# Patient Record
Sex: FEMALE | Race: WHITE | Employment: UNEMPLOYED | URBAN - METROPOLITAN AREA
[De-identification: names, ages, dates, MRNs, and addresses within clinical notes are randomized per-mention and may not be internally consistent; named-entity substitution may affect disease eponyms.]

---

## 2017-01-18 ENCOUNTER — TELEPHONE (OUTPATIENT)
Dept: OBGYN CLINIC | Facility: CLINIC | Age: 27
End: 2017-01-18

## 2017-01-18 NOTE — TELEPHONE ENCOUNTER
Pt. States that she is 4 weeks and 5 days pregnant. She confirmed pregnancy with home pregnancy test and her PCP did a urine test to confirm her pregnancy. LMP: 12/16/2016. Pt. Would like to schedule an appt.

## 2017-01-18 NOTE — TELEPHONE ENCOUNTER
Pt confirms message below and LMP 12/16/16. Pt stated she is currently taking PNV with DHA. Pt informed that our practice has 6 doctors (2 male, 3 female) and pt will see all of them throughout PN care. Pt verbalized understanding and agreed.  Pt informed h

## 2017-01-26 ENCOUNTER — TELEPHONE (OUTPATIENT)
Dept: OBGYN CLINIC | Facility: CLINIC | Age: 27
End: 2017-01-26

## 2017-01-26 ENCOUNTER — OFFICE VISIT (OUTPATIENT)
Dept: OBGYN CLINIC | Facility: CLINIC | Age: 27
End: 2017-01-26

## 2017-01-26 VITALS — HEART RATE: 66 BPM | SYSTOLIC BLOOD PRESSURE: 108 MMHG | WEIGHT: 116 LBS | DIASTOLIC BLOOD PRESSURE: 69 MMHG

## 2017-01-26 DIAGNOSIS — O20.0 THREATENED ABORTION, ANTEPARTUM: Primary | ICD-10-CM

## 2017-01-26 PROCEDURE — 99213 OFFICE O/P EST LOW 20 MIN: CPT | Performed by: OBSTETRICS & GYNECOLOGY

## 2017-01-26 RX ORDER — CHOLECALCIFEROL (VITAMIN D3) 25 MCG
1 TABLET,CHEWABLE ORAL DAILY
COMMUNITY
End: 2019-03-18

## 2017-01-26 NOTE — TELEPHONE ENCOUNTER
Fax received from pts PCP with hcg quant results. Results given to [address] [place] [address] for review.

## 2017-01-26 NOTE — H&P
First Trimester VB    HPI:  The patient is a 32 yo  @ 5w5d by LMP of 16 who presents c/o VB on and off for the last 2 days. She states on Monday night she started to feel cramping.   Then on Tuesday she started having small amt of vb when she wou VB  Musculoskeletal:  denies back pain. Skin/Breast:  Denies any breast pain, lumps, or discharge. Neurological:  denies headaches, extremity weakness  Psychiatric: denies depression or anxiety.        01/26/17  1418   BP: 108/69   Pulse: 66       PHYSIC

## 2017-01-26 NOTE — TELEPHONE ENCOUNTER
Please follow up hcg on Saturday morning (pt to have HCG Friday after work) with doc on call as I will be out of town. Pt with first trimester bleeding. Approx 5w5d today. If irregular inc then may need tvus to r/o ectopic.

## 2017-01-26 NOTE — TELEPHONE ENCOUNTER
Pt about 5w5d based off LMP of 12/17/16 and is calling today to report that she has been experiencing some vaginal spotting since Tuesday (1/24). Pt stated she went to her PCP yesterday and had an hcg quant level drawn and it was 1501.  Pt stated the spotti

## 2017-01-27 ENCOUNTER — APPOINTMENT (OUTPATIENT)
Dept: LAB | Facility: HOSPITAL | Age: 27
End: 2017-01-27
Attending: OBSTETRICS & GYNECOLOGY
Payer: COMMERCIAL

## 2017-01-27 DIAGNOSIS — O20.0 THREATENED ABORTION, ANTEPARTUM: ICD-10-CM

## 2017-01-27 LAB
B-HCG SERPL-ACNC: 1168.9 MIU/ML
RH BLOOD TYPE: POSITIVE

## 2017-01-27 PROCEDURE — 86901 BLOOD TYPING SEROLOGIC RH(D): CPT

## 2017-01-27 PROCEDURE — 84702 CHORIONIC GONADOTROPIN TEST: CPT

## 2017-01-27 PROCEDURE — 36415 COLL VENOUS BLD VENIPUNCTURE: CPT

## 2017-01-27 PROCEDURE — 86900 BLOOD TYPING SEROLOGIC ABO: CPT

## 2017-01-28 ENCOUNTER — TELEPHONE (OUTPATIENT)
Dept: OBGYN CLINIC | Facility: CLINIC | Age: 27
End: 2017-01-28

## 2017-01-28 ENCOUNTER — HOSPITAL ENCOUNTER (OUTPATIENT)
Facility: HOSPITAL | Age: 27
Discharge: HOME | End: 2017-01-28
Attending: OBSTETRICS & GYNECOLOGY | Admitting: OBSTETRICS & GYNECOLOGY
Payer: COMMERCIAL

## 2017-01-28 ENCOUNTER — HOSPITAL ENCOUNTER (OUTPATIENT)
Facility: HOSPITAL | Age: 27
End: 2017-01-28
Attending: OBSTETRICS & GYNECOLOGY | Admitting: OBSTETRICS & GYNECOLOGY
Payer: COMMERCIAL

## 2017-01-28 ENCOUNTER — HOSPITAL ENCOUNTER (EMERGENCY)
Facility: HOSPITAL | Age: 27
Discharge: HOME | End: 2017-01-28
Attending: EMERGENCY MEDICINE
Payer: COMMERCIAL

## 2017-01-28 ENCOUNTER — APPOINTMENT (OUTPATIENT)
Dept: ULTRASOUND IMAGING | Facility: HOSPITAL | Age: 27
End: 2017-01-28
Attending: EMERGENCY MEDICINE
Payer: COMMERCIAL

## 2017-01-28 VITALS
WEIGHT: 115 LBS | HEIGHT: 61 IN | OXYGEN SATURATION: 98 % | DIASTOLIC BLOOD PRESSURE: 72 MMHG | SYSTOLIC BLOOD PRESSURE: 100 MMHG | HEART RATE: 78 BPM | RESPIRATION RATE: 18 BRPM | TEMPERATURE: 98 F | BODY MASS INDEX: 21.71 KG/M2

## 2017-01-28 DIAGNOSIS — O03.4 INEVITABLE ABORTION: Primary | ICD-10-CM

## 2017-01-28 LAB
ALBUMIN SERPL BCP-MCNC: 4 G/DL (ref 3.5–4.8)
ALBUMIN/GLOB SERPL: 1.5 {RATIO} (ref 1–2)
ALP SERPL-CCNC: 40 U/L (ref 32–100)
ALT SERPL-CCNC: 18 U/L (ref 14–54)
ANION GAP SERPL CALC-SCNC: 6 MMOL/L (ref 0–18)
AST SERPL-CCNC: 19 U/L (ref 15–41)
B-HCG SERPL-ACNC: 1037.8 MIU/ML
BASOPHILS # BLD: 0.1 K/UL (ref 0–0.2)
BASOPHILS NFR BLD: 1 %
BILIRUB SERPL-MCNC: 0.8 MG/DL (ref 0.3–1.2)
BILIRUB UR QL: NEGATIVE
BUN SERPL-MCNC: 7 MG/DL (ref 8–20)
BUN/CREAT SERPL: 12.1 (ref 10–20)
CALCIUM SERPL-MCNC: 9.3 MG/DL (ref 8.5–10.5)
CHLORIDE SERPL-SCNC: 105 MMOL/L (ref 95–110)
CLARITY UR: CLEAR
CO2 SERPL-SCNC: 28 MMOL/L (ref 22–32)
COLOR UR: YELLOW
CREAT SERPL-MCNC: 0.58 MG/DL (ref 0.5–1.5)
EOSINOPHIL # BLD: 0.1 K/UL (ref 0–0.7)
EOSINOPHIL NFR BLD: 1 %
ERYTHROCYTE [DISTWIDTH] IN BLOOD BY AUTOMATED COUNT: 13 % (ref 11–15)
GLOBULIN PLAS-MCNC: 2.7 G/DL (ref 2.5–3.7)
GLUCOSE SERPL-MCNC: 102 MG/DL (ref 70–99)
GLUCOSE UR-MCNC: NEGATIVE MG/DL
HCT VFR BLD AUTO: 38.7 % (ref 35–48)
HGB BLD-MCNC: 13.2 G/DL (ref 12–16)
KETONES UR-MCNC: NEGATIVE MG/DL
LYMPHOCYTES # BLD: 1.4 K/UL (ref 1–4)
LYMPHOCYTES NFR BLD: 28 %
MCH RBC QN AUTO: 31.9 PG (ref 27–32)
MCHC RBC AUTO-ENTMCNC: 34 G/DL (ref 32–37)
MCV RBC AUTO: 93.7 FL (ref 80–100)
MONOCYTES # BLD: 0.4 K/UL (ref 0–1)
MONOCYTES NFR BLD: 8 %
NEUTROPHILS # BLD AUTO: 3.1 K/UL (ref 1.8–7.7)
NEUTROPHILS NFR BLD: 61 %
NITRITE UR QL STRIP.AUTO: NEGATIVE
OSMOLALITY UR CALC.SUM OF ELEC: 286 MOSM/KG (ref 275–295)
PH UR: 7 [PH] (ref 5–8)
PLATELET # BLD AUTO: 302 K/UL (ref 140–400)
PMV BLD AUTO: 7.2 FL (ref 7.4–10.3)
POTASSIUM SERPL-SCNC: 4 MMOL/L (ref 3.3–5.1)
PROT SERPL-MCNC: 6.7 G/DL (ref 5.9–8.4)
PROT UR-MCNC: NEGATIVE MG/DL
RBC # BLD AUTO: 4.13 M/UL (ref 3.7–5.4)
RBC #/AREA URNS AUTO: <1 /HPF
SODIUM SERPL-SCNC: 139 MMOL/L (ref 136–144)
SP GR UR STRIP: 1.01 (ref 1–1.03)
UROBILINOGEN UR STRIP-ACNC: <2
VIT C UR-MCNC: NEGATIVE MG/DL
WBC # BLD AUTO: 5.1 K/UL (ref 4–11)
WBC #/AREA URNS AUTO: <1 /HPF

## 2017-01-28 PROCEDURE — 96372 THER/PROPH/DIAG INJ SC/IM: CPT

## 2017-01-28 PROCEDURE — 76801 OB US < 14 WKS SINGLE FETUS: CPT

## 2017-01-28 PROCEDURE — 99284 EMERGENCY DEPT VISIT MOD MDM: CPT

## 2017-01-28 PROCEDURE — 85025 COMPLETE CBC W/AUTO DIFF WBC: CPT | Performed by: EMERGENCY MEDICINE

## 2017-01-28 PROCEDURE — 36415 COLL VENOUS BLD VENIPUNCTURE: CPT

## 2017-01-28 PROCEDURE — 99201 HC OUTPT EVAL AND MGNT NEW PT LEVEL 1: CPT

## 2017-01-28 PROCEDURE — 80053 COMPREHEN METABOLIC PANEL: CPT | Performed by: OBSTETRICS & GYNECOLOGY

## 2017-01-28 PROCEDURE — 76817 TRANSVAGINAL US OBSTETRIC: CPT

## 2017-01-28 PROCEDURE — 81001 URINALYSIS AUTO W/SCOPE: CPT | Performed by: EMERGENCY MEDICINE

## 2017-01-28 PROCEDURE — 84702 CHORIONIC GONADOTROPIN TEST: CPT | Performed by: EMERGENCY MEDICINE

## 2017-01-28 NOTE — ED INITIAL ASSESSMENT (HPI)
Patient presents to ER with c/o vaginal spotting since Tuesday. Patient is 6 weeks pregnant. Patient saw her OB on Friday and was told to come to ER for ultrasound.

## 2017-01-28 NOTE — TRIAGE
[facility]      Triage Note  1330 To [facility] from ED for methotrexate for ectopic pregnancy. Blood drawn, order sent to pharmacy . Oncology lead notified.   1540 oncology at bedside to administer methotrexate IM. 1600 pt discharged with orders visit:  Tariq Nieto RN  1/28/2017 3:37 PM

## 2017-01-28 NOTE — TELEPHONE ENCOUNTER
Pt calling for results. Saw ERON for bleeding in early pregnancy. Nile Arnold with PCP on 1/24 was 1501. Quant on 1/27 was 1168. Blood type is A POS.  Tasked to CAP-MD on call for results

## 2017-01-30 ENCOUNTER — TELEPHONE (OUTPATIENT)
Dept: OBGYN CLINIC | Facility: CLINIC | Age: 27
End: 2017-01-30

## 2017-01-30 DIAGNOSIS — O00.10 TUBAL ECTOPIC PREGNANCY, UNSPECIFIED WHETHER INTRAUTERINE PREGNANCY PRESENT: Primary | ICD-10-CM

## 2017-01-30 NOTE — ED PROVIDER NOTES
Patient Seen in: [facility] Emergency Department    History   Patient presents with:  Pregnancy Issues (gynecologic)      HPI    Patient presents complaining of vaginal spotting since 4 days ago. No significant bleeding, no clots.   Intermittent c above.    PSFH elements reviewed from today and agreed except as otherwise stated in HPI.     Physical Exam       ED Triage Vitals   BP 01/28/17 0950 110/70 mmHg   Pulse 01/28/17 0950 85   Resp 01/28/17 0950 18   Temp 01/28/17 0950 97.9 °F (36.6 °C)   Temp Final result                 Please view results for these tests on the individual orders. Us Ob 1st Trimester (ECG=10860/29351)    1/28/2017  PROCEDURE: US OB 1ST TRIMESTER (CPT=76801/63858)  COMPARISON: None.   INDICATIONS: Pregnancy with vaginal spot Pulse Ox: 98%, Room air, Normal     Monitor Interpretation:   normal sinus rhythm    Differential Diagnosis: Miscarriage, ectopic    ED Course: Patient presents with ongoing vaginal spotting, no current abdominal pain.   Laboratory testing unremarkable,

## 2017-01-30 NOTE — TELEPHONE ENCOUNTER
JOAN LEFT A MESSAGE TO BE SURE [facility] HAS THE ORDERS FOR THE MTX. I CONFIRMED WITH CHARGE NURSE THEY DO HAVE THE ORDER. I PLACED THE ORDERS FOR THE NEXT SET OF LABS TO BE DONE ON 1-31-17 AND 2-3-17.

## 2017-01-31 ENCOUNTER — LAB ENCOUNTER (OUTPATIENT)
Dept: LAB | Facility: HOSPITAL | Age: 27
End: 2017-01-31
Attending: OBSTETRICS & GYNECOLOGY
Payer: COMMERCIAL

## 2017-01-31 ENCOUNTER — TELEPHONE (OUTPATIENT)
Dept: OBGYN CLINIC | Facility: CLINIC | Age: 27
End: 2017-01-31

## 2017-01-31 DIAGNOSIS — O00.10 TUBAL ECTOPIC PREGNANCY, UNSPECIFIED WHETHER INTRAUTERINE PREGNANCY PRESENT: ICD-10-CM

## 2017-01-31 DIAGNOSIS — O00.90 ECTOPIC PREGNANCY, UNSPECIFIED LOCATION, UNSPECIFIED WHETHER INTRAUTERINE PREGNANCY PRESENT: Primary | ICD-10-CM

## 2017-01-31 LAB
AST SERPL-CCNC: 21 U/L (ref 15–41)
B-HCG SERPL-ACNC: 903.3 MIU/ML
BASOPHILS # BLD: 0.1 K/UL (ref 0–0.2)
BASOPHILS NFR BLD: 1 %
EOSINOPHIL # BLD: 0.1 K/UL (ref 0–0.7)
EOSINOPHIL NFR BLD: 1 %
ERYTHROCYTE [DISTWIDTH] IN BLOOD BY AUTOMATED COUNT: 12.9 % (ref 11–15)
HCT VFR BLD AUTO: 39.9 % (ref 35–48)
HGB BLD-MCNC: 13.6 G/DL (ref 12–16)
LYMPHOCYTES # BLD: 1.3 K/UL (ref 1–4)
LYMPHOCYTES NFR BLD: 24 %
MCH RBC QN AUTO: 32.2 PG (ref 27–32)
MCHC RBC AUTO-ENTMCNC: 34 G/DL (ref 32–37)
MCV RBC AUTO: 94.7 FL (ref 80–100)
MONOCYTES # BLD: 0.4 K/UL (ref 0–1)
MONOCYTES NFR BLD: 7 %
NEUTROPHILS # BLD AUTO: 3.7 K/UL (ref 1.8–7.7)
NEUTROPHILS NFR BLD: 66 %
PLATELET # BLD AUTO: 349 K/UL (ref 140–400)
PMV BLD AUTO: 8.2 FL (ref 7.4–10.3)
RBC # BLD AUTO: 4.21 M/UL (ref 3.7–5.4)
WBC # BLD AUTO: 5.5 K/UL (ref 4–11)

## 2017-01-31 PROCEDURE — 84702 CHORIONIC GONADOTROPIN TEST: CPT

## 2017-01-31 PROCEDURE — 36415 COLL VENOUS BLD VENIPUNCTURE: CPT

## 2017-01-31 PROCEDURE — 84450 TRANSFERASE (AST) (SGOT): CPT

## 2017-01-31 PROCEDURE — 85025 COMPLETE CBC W/AUTO DIFF WBC: CPT

## 2017-01-31 NOTE — TELEPHONE ENCOUNTER
Pt c/o spotting after methotrexate. Pt denies any pain. Pt asking for results of labs, had then drawn this morning at [facility]> pt informed labs have not yet been resulted but that we will forward results to [facility] when available.  Pt also asking if she will need t

## 2017-01-31 NOTE — TELEPHONE ENCOUNTER
Pt was given an inj on Sat for etopic preg.   Pt still spotting on-off; no cramping  ALSO, if avail, w-like results of blood test done today/HCG

## 2017-02-01 NOTE — TELEPHONE ENCOUNTER
Pt miscarried/had inj on Sat; having heavy bleeding; cramping   Wants to know if this normal?  Should she be seen?

## 2017-02-01 NOTE — TELEPHONE ENCOUNTER
C/O BLEEDING HEAVIER THEN BEFORE. STARTED SPOTTING A WEEK AGO TUE AND SPOTTED UNTIL THE BLEEDING YESTERDAY. IS CHANGING PAD Q 3-4 HOURS AND HAVING SOME CRAMPING. ADVISED TO USE TYLENOL FOR CRAMPING RIGHT NOW.   REASSURED HER THE CRAMPING IS HER BODY TRYI

## 2017-02-01 NOTE — TELEPHONE ENCOUNTER
Per NJVIKAS pt should have labs drawn on Friday: AST, CBC and quant. Pt should make MD appt Friday afternoon to discuss results. Pt declines appt, states that she has missed too much work.  Pt will have labs drawn and will call Friday afternoon or Saturday am f

## 2017-02-02 ENCOUNTER — TELEPHONE (OUTPATIENT)
Dept: OBGYN CLINIC | Facility: CLINIC | Age: 27
End: 2017-02-02

## 2017-02-02 NOTE — TELEPHONE ENCOUNTER
Per the pt she had treatment for an atopic pregnancy, and would like to know if she can take migraine medication. Please advise.

## 2017-02-02 NOTE — TELEPHONE ENCOUNTER
Pt had an ectopic and was treated with Methotrexate last Saturday, 1/28. Pt was told to avoid all meds unless she consults with MD. Pt asking if she can safely take Extra Strength Tylenol for a migraine.  Reviewed with Ophelia Sawyer in office and LM informing pt i

## 2017-02-03 ENCOUNTER — LAB ENCOUNTER (OUTPATIENT)
Dept: LAB | Facility: HOSPITAL | Age: 27
End: 2017-02-03
Attending: OBSTETRICS & GYNECOLOGY
Payer: COMMERCIAL

## 2017-02-03 DIAGNOSIS — O00.90 ECTOPIC PREGNANCY, UNSPECIFIED LOCATION, UNSPECIFIED WHETHER INTRAUTERINE PREGNANCY PRESENT: ICD-10-CM

## 2017-02-03 LAB
AST SERPL-CCNC: 20 U/L (ref 15–41)
B-HCG SERPL-ACNC: 161.5 MIU/ML
BASOPHILS # BLD: 0.1 K/UL (ref 0–0.2)
BASOPHILS NFR BLD: 2 %
EOSINOPHIL # BLD: 0.1 K/UL (ref 0–0.7)
EOSINOPHIL NFR BLD: 1 %
ERYTHROCYTE [DISTWIDTH] IN BLOOD BY AUTOMATED COUNT: 13.1 % (ref 11–15)
HCT VFR BLD AUTO: 41.2 % (ref 35–48)
HGB BLD-MCNC: 14.1 G/DL (ref 12–16)
LYMPHOCYTES # BLD: 1.5 K/UL (ref 1–4)
LYMPHOCYTES NFR BLD: 30 %
MCH RBC QN AUTO: 31.9 PG (ref 27–32)
MCHC RBC AUTO-ENTMCNC: 34.3 G/DL (ref 32–37)
MCV RBC AUTO: 93 FL (ref 80–100)
MONOCYTES # BLD: 0.3 K/UL (ref 0–1)
MONOCYTES NFR BLD: 7 %
NEUTROPHILS # BLD AUTO: 3.1 K/UL (ref 1.8–7.7)
NEUTROPHILS NFR BLD: 60 %
PLATELET # BLD AUTO: 319 K/UL (ref 140–400)
PMV BLD AUTO: 7.6 FL (ref 7.4–10.3)
RBC # BLD AUTO: 4.42 M/UL (ref 3.7–5.4)
WBC # BLD AUTO: 5.1 K/UL (ref 4–11)

## 2017-02-03 PROCEDURE — 84450 TRANSFERASE (AST) (SGOT): CPT

## 2017-02-03 PROCEDURE — 36415 COLL VENOUS BLD VENIPUNCTURE: CPT

## 2017-02-03 PROCEDURE — 85025 COMPLETE CBC W/AUTO DIFF WBC: CPT

## 2017-02-03 PROCEDURE — 84702 CHORIONIC GONADOTROPIN TEST: CPT

## 2017-02-04 ENCOUNTER — TELEPHONE (OUTPATIENT)
Dept: OBGYN CLINIC | Facility: CLINIC | Age: 27
End: 2017-02-04

## 2017-02-04 NOTE — TELEPHONE ENCOUNTER
Per NJG, pt responded to methotrexate beautifully. Pt is to use condoms until quants to zero. Pt is to make an appt if she has questions about future pregnancies. Gave pt results and NJG's recs.   Pt aked that she needs to wait to become pregnant until l

## 2017-02-06 NOTE — TELEPHONE ENCOUNTER
Message below was sent to [address] [employer] on 2/4 to please review pts questions and give recs. Pt asking how often she needs to return for quant levels also, and pt was informed that she needs to be coming to lab weekly for hcg quant draws until number reaches zero.  P

## 2017-02-07 NOTE — TELEPHONE ENCOUNTER
Weekly quants -- may resume exercise but condoms until quant zero & then condoms until next spontaneous period occurs.  Once quant zero, restart PNV

## 2017-02-10 ENCOUNTER — TELEPHONE (OUTPATIENT)
Dept: OBGYN CLINIC | Facility: CLINIC | Age: 27
End: 2017-02-10

## 2017-02-10 ENCOUNTER — APPOINTMENT (OUTPATIENT)
Dept: LAB | Facility: HOSPITAL | Age: 27
End: 2017-02-10
Attending: OBSTETRICS & GYNECOLOGY
Payer: COMMERCIAL

## 2017-02-10 DIAGNOSIS — O00.90 ECTOPIC PREGNANCY, UNSPECIFIED LOCATION, UNSPECIFIED WHETHER INTRAUTERINE PREGNANCY PRESENT: ICD-10-CM

## 2017-02-10 DIAGNOSIS — O00.90 ECTOPIC PREGNANCY, UNSPECIFIED LOCATION, UNSPECIFIED WHETHER INTRAUTERINE PREGNANCY PRESENT: Primary | ICD-10-CM

## 2017-02-10 LAB — B-HCG SERPL-ACNC: 26.2 MIU/ML

## 2017-02-10 PROCEDURE — 36415 COLL VENOUS BLD VENIPUNCTURE: CPT

## 2017-02-10 PROCEDURE — 84702 CHORIONIC GONADOTROPIN TEST: CPT

## 2017-02-11 ENCOUNTER — TELEPHONE (OUTPATIENT)
Dept: OBGYN CLINIC | Facility: CLINIC | Age: 27
End: 2017-02-11

## 2017-02-11 NOTE — TELEPHONE ENCOUNTER
Pt is wondering if she has to abstain from intercourse until her levels are zero, or can she have intercourse as long as she uses protection. Pt informed she can have intercourse, but she needs to use condoms.   Pt informed she does not want to become preg

## 2017-02-17 ENCOUNTER — APPOINTMENT (OUTPATIENT)
Dept: LAB | Facility: HOSPITAL | Age: 27
End: 2017-02-17
Attending: OBSTETRICS & GYNECOLOGY
Payer: COMMERCIAL

## 2017-02-17 ENCOUNTER — TELEPHONE (OUTPATIENT)
Dept: OBGYN CLINIC | Facility: CLINIC | Age: 27
End: 2017-02-17

## 2017-02-17 DIAGNOSIS — O00.90 ECTOPIC PREGNANCY, UNSPECIFIED LOCATION, UNSPECIFIED WHETHER INTRAUTERINE PREGNANCY PRESENT: ICD-10-CM

## 2017-02-17 LAB — B-HCG SERPL-ACNC: 3.9 MIU/ML

## 2017-02-17 PROCEDURE — 36415 COLL VENOUS BLD VENIPUNCTURE: CPT

## 2017-02-17 PROCEDURE — 84702 CHORIONIC GONADOTROPIN TEST: CPT

## 2017-02-17 NOTE — TELEPHONE ENCOUNTER
Spoke with Danya Soto in lab who stated that pts quant level was put in as a standing order. Pt informed and notified that order is in for her quant level and can go today. Pt verbalized understanding.

## 2017-02-18 ENCOUNTER — TELEPHONE (OUTPATIENT)
Dept: OBGYN CLINIC | Facility: CLINIC | Age: 27
End: 2017-02-18

## 2017-02-20 ENCOUNTER — TELEPHONE (OUTPATIENT)
Dept: OBGYN CLINIC | Facility: CLINIC | Age: 27
End: 2017-02-20

## 2017-02-20 DIAGNOSIS — O03.9 SAB (SPONTANEOUS ABORTION): Primary | ICD-10-CM

## 2017-02-20 NOTE — TELEPHONE ENCOUNTER
----- Message from Rosie Douglas DO sent at 2/20/2017  8:54 AM CST -----  Please notify of results. Needs hcg in 1 week. Please coordinate.

## 2017-02-24 ENCOUNTER — APPOINTMENT (OUTPATIENT)
Dept: LAB | Facility: HOSPITAL | Age: 27
End: 2017-02-24
Attending: OBSTETRICS & GYNECOLOGY
Payer: COMMERCIAL

## 2017-02-24 DIAGNOSIS — O03.9 SAB (SPONTANEOUS ABORTION): ICD-10-CM

## 2017-02-24 LAB — B-HCG SERPL-ACNC: 0.7 MIU/ML

## 2017-02-24 PROCEDURE — 84702 CHORIONIC GONADOTROPIN TEST: CPT

## 2017-02-24 PROCEDURE — 36415 COLL VENOUS BLD VENIPUNCTURE: CPT

## 2017-02-25 ENCOUNTER — TELEPHONE (OUTPATIENT)
Dept: OBGYN CLINIC | Facility: CLINIC | Age: 27
End: 2017-02-25

## 2017-02-25 NOTE — TELEPHONE ENCOUNTER
Pt informed level is 0.7. Does pt need another level? Pt advised results to be sent to [address] [place] [address] for review.

## 2017-02-27 ENCOUNTER — TELEPHONE (OUTPATIENT)
Dept: OBGYN CLINIC | Facility: CLINIC | Age: 27
End: 2017-02-27

## 2017-02-27 DIAGNOSIS — O03.4 INCOMPLETE SPONTANEOUS ABORTION: Primary | ICD-10-CM

## 2017-02-27 NOTE — TELEPHONE ENCOUNTER
See communication from 2/25/17. Pt informed level is 0.7 for hcg from 2/24/17. Does pt need another level? Pt advised results to be sent to [address] [place] [address] for review.      Pt also wanting to know when she can start her pnv with dha and how long before she can try to

## 2017-02-27 NOTE — TELEPHONE ENCOUNTER
We need to follow quant to normal range. Repeat in 1 week. Hold off on PNV until we know that her hcg is normal, then she can start it. Abstain from IC until labs wnl.

## 2017-02-27 NOTE — TELEPHONE ENCOUNTER
PT NOTIFIED OF RECS AND VERBALIZED UNDERSTANDING. ADVISED NO IC UNTIL QUANT IS NEGATIVE. PT TO WAIT FOR AT LEAST 1 SPONTANEOUS PERIOD BEFORE ATTEMPTING CONCEPTION AGAIN.

## 2017-03-03 ENCOUNTER — APPOINTMENT (OUTPATIENT)
Dept: LAB | Facility: HOSPITAL | Age: 27
End: 2017-03-03
Attending: OBSTETRICS & GYNECOLOGY
Payer: COMMERCIAL

## 2017-03-03 DIAGNOSIS — O03.9 SAB (SPONTANEOUS ABORTION): ICD-10-CM

## 2017-03-03 LAB — B-HCG SERPL-ACNC: <0.6 MIU/ML

## 2017-03-03 PROCEDURE — 36415 COLL VENOUS BLD VENIPUNCTURE: CPT

## 2017-03-03 PROCEDURE — 84702 CHORIONIC GONADOTROPIN TEST: CPT

## 2017-03-04 ENCOUNTER — TELEPHONE (OUTPATIENT)
Dept: OBGYN CLINIC | Facility: CLINIC | Age: 27
End: 2017-03-04

## 2017-03-04 NOTE — TELEPHONE ENCOUNTER
Pt advised of quant results = <0.6 from 3/3/17. Pt states is on her period and asking if ok to start trying again after cycle. Pt advised ok to start after cycle and to start taking PNV with DHA.   Pt informed a msg will be sent to [address] [place] [address] and will call her ba

## 2017-03-04 NOTE — TELEPHONE ENCOUNTER
Blood test results 3/3/17 [facility]; Dr. Mathew Lucia told results would be in td.  Okay to [facility] [phone]

## 2017-03-29 ENCOUNTER — APPOINTMENT (OUTPATIENT)
Dept: LAB | Facility: HOSPITAL | Age: 27
End: 2017-03-29
Attending: OBSTETRICS & GYNECOLOGY
Payer: COMMERCIAL

## 2017-03-29 ENCOUNTER — TELEPHONE (OUTPATIENT)
Dept: OBGYN CLINIC | Facility: CLINIC | Age: 27
End: 2017-03-29

## 2017-03-29 DIAGNOSIS — Z32.00 PREGNANCY EXAMINATION OR TEST, PREGNANCY UNCONFIRMED: Primary | ICD-10-CM

## 2017-03-29 DIAGNOSIS — Z32.00 PREGNANCY EXAMINATION OR TEST, PREGNANCY UNCONFIRMED: ICD-10-CM

## 2017-03-29 PROCEDURE — 84703 CHORIONIC GONADOTROPIN ASSAY: CPT

## 2017-03-29 PROCEDURE — 36415 COLL VENOUS BLD VENIPUNCTURE: CPT

## 2017-03-29 NOTE — TELEPHONE ENCOUNTER
Pt confirms message below and LMP of 3/1. Pt stated she is currently taking PNV with DHA. Pt aware that she will rotate through all 6 MDs throughout care. Pt informed her first appt will be an hour long appt with the nurse.  Pt accepted OBN appt on 4/22 at

## 2017-04-03 ENCOUNTER — OFFICE VISIT (OUTPATIENT)
Dept: OBGYN CLINIC | Facility: CLINIC | Age: 27
End: 2017-04-03

## 2017-04-03 ENCOUNTER — APPOINTMENT (OUTPATIENT)
Dept: LAB | Facility: HOSPITAL | Age: 27
End: 2017-04-03
Attending: OBSTETRICS & GYNECOLOGY
Payer: COMMERCIAL

## 2017-04-03 ENCOUNTER — TELEPHONE (OUTPATIENT)
Dept: OBGYN CLINIC | Facility: CLINIC | Age: 27
End: 2017-04-03

## 2017-04-03 VITALS
WEIGHT: 114 LBS | DIASTOLIC BLOOD PRESSURE: 85 MMHG | SYSTOLIC BLOOD PRESSURE: 125 MMHG | HEART RATE: 72 BPM | BODY MASS INDEX: 22 KG/M2

## 2017-04-03 DIAGNOSIS — O20.9 FIRST TRIMESTER BLEEDING: ICD-10-CM

## 2017-04-03 DIAGNOSIS — O20.9 FIRST TRIMESTER BLEEDING: Primary | ICD-10-CM

## 2017-04-03 PROCEDURE — 84702 CHORIONIC GONADOTROPIN TEST: CPT

## 2017-04-03 PROCEDURE — 36415 COLL VENOUS BLD VENIPUNCTURE: CPT

## 2017-04-03 PROCEDURE — 99213 OFFICE O/P EST LOW 20 MIN: CPT | Performed by: OBSTETRICS & GYNECOLOGY

## 2017-04-03 NOTE — TELEPHONE ENCOUNTER
[facility]  [facility] and an hcg was ordered. Pt calling for results-HCG is 4.0. Pt understands we will call her with ERON's recs. [facility]  to please review.

## 2017-04-03 NOTE — TELEPHONE ENCOUNTER
Returning call from last night. Wants to know if she needs to get her levels checked and is there anything she needs to do at this point. Please advise.

## 2017-04-03 NOTE — TELEPHONE ENCOUNTER
Pts LMP was 3/1/17 and called on 3/29/17 to report +HPT. Pt stated that she has a hx of miscarriage in January 2017. Pt stated that she started having some vaginal spotting on Saturday.  Pt stated the spotting was very light and pink in color and denied any

## 2017-04-04 NOTE — TELEPHONE ENCOUNTER
Notify hcg is 4. Likely miscarriage. Okay to repeat in 72 hours from last to see if inc or c/w miscarriage. Pelvic rest, bleeding, precautions, and abd pain precautions need to be given.

## 2017-04-04 NOTE — TELEPHONE ENCOUNTER
Pt informed of MAZs recs below and verbalized understanding. Bleeding & pain precautions given. hcg quant ordered.

## 2017-04-05 NOTE — H&P
HPI:  The patient is a 32 yo  @ 4w5d by LMP: 3/1/17 with +UPT recently who presents c/o VB and passage of tissue. On Saturday she started have light spotting. On , the bleeding became heavier in flow and started passing tissue.   Bleeding no 125/85   Pulse: 72       PHYSICAL EXAM:   Constitutional: well developed, well nourished  Head/Face: normocephalic  Abdomen:  soft, nontender, nondistended, no masses  Psychiatric: Appropriate mood and affect    Pelvic Exam:  External Genitalia: normal umesh

## 2017-04-06 ENCOUNTER — APPOINTMENT (OUTPATIENT)
Dept: LAB | Facility: HOSPITAL | Age: 27
End: 2017-04-06
Attending: OBSTETRICS & GYNECOLOGY
Payer: COMMERCIAL

## 2017-04-06 DIAGNOSIS — O20.9 FIRST TRIMESTER BLEEDING: ICD-10-CM

## 2017-04-06 PROCEDURE — 36415 COLL VENOUS BLD VENIPUNCTURE: CPT

## 2017-04-06 PROCEDURE — 84702 CHORIONIC GONADOTROPIN TEST: CPT

## 2017-04-07 ENCOUNTER — TELEPHONE (OUTPATIENT)
Dept: OBGYN CLINIC | Facility: CLINIC | Age: 27
End: 2017-04-07

## 2017-04-07 NOTE — TELEPHONE ENCOUNTER
Pt informed that HCG quant is 1.4. Recs from [address] [place] [address] St that pt needs to return in 1 week for repeat quant. Pt verbalized understanding. Pt asking what the next steps will be.  Pt informed that once her quant is zero and she has a spontaneous period, usually the

## 2017-04-14 ENCOUNTER — APPOINTMENT (OUTPATIENT)
Dept: LAB | Facility: HOSPITAL | Age: 27
End: 2017-04-14
Attending: OBSTETRICS & GYNECOLOGY
Payer: COMMERCIAL

## 2017-04-14 DIAGNOSIS — O20.9 FIRST TRIMESTER BLEEDING: ICD-10-CM

## 2017-04-14 PROCEDURE — 36415 COLL VENOUS BLD VENIPUNCTURE: CPT

## 2017-04-14 PROCEDURE — 84702 CHORIONIC GONADOTROPIN TEST: CPT

## 2017-04-15 ENCOUNTER — TELEPHONE (OUTPATIENT)
Dept: OBGYN CLINIC | Facility: CLINIC | Age: 27
End: 2017-04-15

## 2017-04-15 NOTE — TELEPHONE ENCOUNTER
Pt informed hcg quant is <0.6 and is considered negative. Pt verbalized understanding. Message to [address] [place] [address] for sign off.

## 2017-05-04 ENCOUNTER — TELEPHONE (OUTPATIENT)
Dept: OBGYN CLINIC | Facility: CLINIC | Age: 27
End: 2017-05-04

## 2017-05-04 NOTE — TELEPHONE ENCOUNTER
Pt looking to travel outside of [facility] within the next couple months , questions about zika, if she were to try and conceive on vacation would that have any effect or should she wait until she comes back to try and  conceive.  Pt recently had miscarriage wh

## 2017-05-04 NOTE — TELEPHONE ENCOUNTER
Pt states she is going to [facility] in October and is asking if it's safe to try to conceive there or shortly after. Informed pt that we advise against going to any Zika areas while pregnant or trying to get pregnant.  Pt is to use birth control

## 2018-04-16 ENCOUNTER — TELEPHONE (OUTPATIENT)
Dept: OBGYN CLINIC | Facility: CLINIC | Age: 28
End: 2018-04-16

## 2018-04-16 DIAGNOSIS — N96 HISTORY OF MULTIPLE MISCARRIAGES: ICD-10-CM

## 2018-04-16 DIAGNOSIS — Z32.00 POSSIBLE PREGNANCY, NOT CONFIRMED: Primary | ICD-10-CM

## 2018-04-16 NOTE — TELEPHONE ENCOUNTER
Pt informed we are still waiting for [facility] recs in regards to blood preg test. Pt verbalized understanding.

## 2018-04-16 NOTE — TELEPHONE ENCOUNTER
Pt calling to report +HPT and LMP of 3/17/18. Pt stated she has regular cycles, every 29-30 days. Pt stated she is on PNV with DHA. Pt stated she has had miscarriages x 2 (one in January 2017 and one in march 2017).  Pt familiar with rotating through all MD

## 2018-04-17 ENCOUNTER — APPOINTMENT (OUTPATIENT)
Dept: LAB | Facility: HOSPITAL | Age: 28
End: 2018-04-17
Attending: OBSTETRICS & GYNECOLOGY
Payer: COMMERCIAL

## 2018-04-17 ENCOUNTER — TELEPHONE (OUTPATIENT)
Dept: PEDIATRICS CLINIC | Facility: CLINIC | Age: 28
End: 2018-04-17

## 2018-04-17 DIAGNOSIS — N96 HISTORY OF MULTIPLE MISCARRIAGES: ICD-10-CM

## 2018-04-17 DIAGNOSIS — Z32.00 POSSIBLE PREGNANCY, NOT CONFIRMED: ICD-10-CM

## 2018-04-17 PROCEDURE — 84702 CHORIONIC GONADOTROPIN TEST: CPT

## 2018-04-17 PROCEDURE — 36415 COLL VENOUS BLD VENIPUNCTURE: CPT

## 2018-04-17 NOTE — TELEPHONE ENCOUNTER
PT NOTIFIED OF RECS. STATES SHE DOES NOT WANT TO DO MORE THAN 1 PREGNANCY TEST BECAUSE SHE INCURRED A LOT OF BILLS FROM THE BLOOD TESTS (QUANTS) DONE LAST YEAR. SHE IS COMFORTABLE WITH JUST CONFIRMING THE PREGNANCY.   STATES SHE IS TAKING A CHEWABLE PN VI

## 2018-04-21 ENCOUNTER — TELEPHONE (OUTPATIENT)
Dept: PEDIATRICS CLINIC | Facility: CLINIC | Age: 28
End: 2018-04-21

## 2018-04-21 NOTE — TELEPHONE ENCOUNTER
Pt is 5 weeks pregnant and asking if she can eat hotdogs because she has been having cravings. Advised pt it is okay to eat a hotdog sparingly throughout pregnancy.  Advised to make sure the hotdog is fresh and fully cooked and from a restaurant with high t

## 2018-04-23 ENCOUNTER — TELEPHONE (OUTPATIENT)
Dept: OBGYN CLINIC | Facility: CLINIC | Age: 28
End: 2018-04-23

## 2018-04-23 NOTE — TELEPHONE ENCOUNTER
Fax failed. Called pt to verify the fax #. She is at the dental office now. [phone] is the correct fax. Faxed again.

## 2018-04-23 NOTE — TELEPHONE ENCOUNTER
Pt states she is 5 weeks pregnant and has an infected tooth that may need to be pulled. She is asking if this is OK.  Informed her it is ok, and we have a dental letter we can send to her Dentist. Pt states she has a consult today but would like it to be se

## 2018-04-25 ENCOUNTER — TELEPHONE (OUTPATIENT)
Dept: OBGYN CLINIC | Facility: CLINIC | Age: 28
End: 2018-04-25

## 2018-04-25 NOTE — TELEPHONE ENCOUNTER
Pt voices she thought she had a yeast infection, but now, she feels it's just irritation from wiping so often because she voids a lot. Pt denies any vaginal discharge or odor, but has occasional tenderness when wiping and vaginal itching.  Pt would like to

## 2018-04-26 ENCOUNTER — TELEPHONE (OUTPATIENT)
Dept: OBGYN CLINIC | Facility: CLINIC | Age: 28
End: 2018-04-26

## 2018-04-26 NOTE — TELEPHONE ENCOUNTER
Pt advised to eat cheese that has been pasteurized. Pt advised OK to sweep and mop. Pt verbalizes understanding.

## 2018-04-26 NOTE — TELEPHONE ENCOUNTER
Pt Is 5 weeks pregnant and wondering what cheese she can eat and if its ok to sweep and mop.  Please advise

## 2018-05-03 ENCOUNTER — NURSE ONLY (OUTPATIENT)
Dept: OBGYN CLINIC | Facility: CLINIC | Age: 28
End: 2018-05-03

## 2018-05-03 VITALS — WEIGHT: 124.25 LBS | HEIGHT: 61.5 IN | BODY MASS INDEX: 23.16 KG/M2

## 2018-05-03 DIAGNOSIS — Z34.81 ENCOUNTER FOR SUPERVISION OF OTHER NORMAL PREGNANCY IN FIRST TRIMESTER: Primary | ICD-10-CM

## 2018-05-03 NOTE — PROGRESS NOTES
Pt seen for OBN appt today with no complaints. Normal PN labs ordered. Pt advised all labs must be completed and resulted prior to MD appt. NPN appt with MD scheduled with CAP on 5/14/18. Pt and FOB were in [facility] in 10/2017.     Pt has had 2 miscarri Seb-Sachs Disease No    Thalassemia No    Other inherited genetic or chromosomal disorders No    Patient or baby's father had a child with birth defects not listed above No    Previous miscarriages or stillborn Yes Pt has had 2 miscarriages,  FOB's aunt

## 2018-05-09 ENCOUNTER — TELEPHONE (OUTPATIENT)
Dept: OBGYN CLINIC | Facility: CLINIC | Age: 28
End: 2018-05-09

## 2018-05-10 ENCOUNTER — LAB ENCOUNTER (OUTPATIENT)
Dept: LAB | Facility: HOSPITAL | Age: 28
End: 2018-05-10
Attending: OBSTETRICS & GYNECOLOGY
Payer: COMMERCIAL

## 2018-05-10 DIAGNOSIS — Z34.81 ENCOUNTER FOR SUPERVISION OF OTHER NORMAL PREGNANCY IN FIRST TRIMESTER: ICD-10-CM

## 2018-05-10 PROCEDURE — 86901 BLOOD TYPING SEROLOGIC RH(D): CPT

## 2018-05-10 PROCEDURE — 87340 HEPATITIS B SURFACE AG IA: CPT

## 2018-05-10 PROCEDURE — 86900 BLOOD TYPING SEROLOGIC ABO: CPT

## 2018-05-10 PROCEDURE — 87086 URINE CULTURE/COLONY COUNT: CPT

## 2018-05-10 PROCEDURE — 86780 TREPONEMA PALLIDUM: CPT

## 2018-05-10 PROCEDURE — 86850 RBC ANTIBODY SCREEN: CPT

## 2018-05-10 PROCEDURE — 87389 HIV-1 AG W/HIV-1&-2 AB AG IA: CPT

## 2018-05-10 PROCEDURE — 36415 COLL VENOUS BLD VENIPUNCTURE: CPT

## 2018-05-10 PROCEDURE — 85025 COMPLETE CBC W/AUTO DIFF WBC: CPT

## 2018-05-10 PROCEDURE — 86762 RUBELLA ANTIBODY: CPT

## 2018-05-14 ENCOUNTER — HOSPITAL ENCOUNTER (OUTPATIENT)
Dept: ULTRASOUND IMAGING | Facility: HOSPITAL | Age: 28
Discharge: HOME | End: 2018-05-14
Attending: OBSTETRICS & GYNECOLOGY
Payer: COMMERCIAL

## 2018-05-14 ENCOUNTER — INITIAL PRENATAL (OUTPATIENT)
Dept: OBGYN CLINIC | Facility: CLINIC | Age: 28
End: 2018-05-14

## 2018-05-14 ENCOUNTER — TELEPHONE (OUTPATIENT)
Dept: OBGYN CLINIC | Facility: CLINIC | Age: 28
End: 2018-05-14

## 2018-05-14 VITALS
SYSTOLIC BLOOD PRESSURE: 116 MMHG | WEIGHT: 128 LBS | HEART RATE: 71 BPM | DIASTOLIC BLOOD PRESSURE: 72 MMHG | BODY MASS INDEX: 24 KG/M2

## 2018-05-14 DIAGNOSIS — O20.0 THREATENED ABORTION: ICD-10-CM

## 2018-05-14 DIAGNOSIS — Z34.91 ENCOUNTER FOR SUPERVISION OF NORMAL PREGNANCY IN FIRST TRIMESTER, UNSPECIFIED GRAVIDITY: Primary | ICD-10-CM

## 2018-05-14 PROCEDURE — 76817 TRANSVAGINAL US OBSTETRIC: CPT | Performed by: OBSTETRICS & GYNECOLOGY

## 2018-05-14 PROCEDURE — 81002 URINALYSIS NONAUTO W/O SCOPE: CPT | Performed by: OBSTETRICS & GYNECOLOGY

## 2018-05-14 PROCEDURE — 76801 OB US < 14 WKS SINGLE FETUS: CPT | Performed by: OBSTETRICS & GYNECOLOGY

## 2018-05-14 RX ORDER — ACETAMINOPHEN 500 MG
500 TABLET ORAL EVERY 6 HOURS PRN
Status: ON HOLD | COMMUNITY
End: 2018-12-23

## 2018-05-14 NOTE — PROGRESS NOTES
Per CAP Hold and Call US as not able to visualize fetus. Per Angelica Argue pt to finish drinking 32oz of water by 2:50pm and hold until appt at 4pm.  [facility]- diagnostic main. Instructions and copy of order given to pt. Pt states understanding and agrees.   C

## 2018-05-14 NOTE — TELEPHONE ENCOUNTER
Paged from [address] [place] [address]. Viable IUP 1 days off LMP. I spoke with [facility] to relay results.

## 2018-05-15 ENCOUNTER — TELEPHONE (OUTPATIENT)
Dept: OBGYN CLINIC | Facility: CLINIC | Age: 28
End: 2018-05-15

## 2018-05-15 NOTE — TELEPHONE ENCOUNTER
Pt informed that manicures and pedicures are fine, as long as she is in a well ventilated area. Pt expressed understanding.

## 2018-05-15 NOTE — TELEPHONE ENCOUNTER
The pt is 8 weeks pregnant, and would like to know if it is safe for her to get a manicure and pedicure. Please advise.

## 2018-05-21 ENCOUNTER — TELEPHONE (OUTPATIENT)
Dept: PEDIATRICS CLINIC | Facility: CLINIC | Age: 28
End: 2018-05-21

## 2018-05-21 DIAGNOSIS — Z78.9 HISTORY OF FOREIGN TRAVEL: Primary | ICD-10-CM

## 2018-05-21 NOTE — TELEPHONE ENCOUNTER
Pt needs referral to MFM per CAPs office visit notes from 5/14/18---\"discussed poss zika exposure- referred to MFM\". Message to referral pool.

## 2018-05-23 ENCOUNTER — TELEPHONE (OUTPATIENT)
Dept: OBGYN CLINIC | Facility: CLINIC | Age: 28
End: 2018-05-23

## 2018-05-23 NOTE — TELEPHONE ENCOUNTER
Pt is 9w4d and states she cannot remember if she took her PNV today. Pt asking if she takes another PNV will she overdose? Advised pt that she will not overdose on one occurrence of taking a PNV twice.  Advised pt that it will also not harm the baby if she

## 2018-05-24 ENCOUNTER — TELEPHONE (OUTPATIENT)
Dept: OBGYN CLINIC | Facility: CLINIC | Age: 28
End: 2018-05-24

## 2018-05-24 NOTE — TELEPHONE ENCOUNTER
Questions regarding the [address] consults, pt would like just get test it and skip the consult

## 2018-05-24 NOTE — PROGRESS NOTES
Outpatient Maternal-Fetal Medicine Consultation    Dear Dr. Rosario Zhong,    Thank you for requesting maternal fetal medicine consultation on your patient Pancho Posadas.   As you are aware she is a 28year old female with a Guzman pregnancy; YAIMA 12/22/1 for fetal Zika virus infection. The minimum time between occurrence of maternal Zika virus infection and development of sonographic signs suggestive of fetal infection is not known.  In women infected early in pregnancy, ultrasound findings associated wi infection with an increased risk for congenital microcephaly and other abnormalities of the brain and eye prompted the [place].  Centers for Disease Control and Prevention (CDC) to issue a travel advisory for pregnant women recommending that “Pregnant women in If travel or exposure was recent (within 2 weeks of last possible exposure), RNA HOSSEIN testing is recommended on serum and urine. RNA HOSSEIN testing is also indicated for pregnant women who present for care ?  2 weeks after exposure and have been found to be Ig

## 2018-05-24 NOTE — TELEPHONE ENCOUNTER
Pt states at her NPN visit she discussed zika testing with CAP because pt was in [facility] in October 2017. CAP recommended a consult and zika testing through [facility]. Pt is scheduled for tomorrow at 10am.  Pt feels she really doesn't need the consult.   States

## 2018-05-25 ENCOUNTER — HOSPITAL ENCOUNTER (OUTPATIENT)
Dept: PERINATAL CARE | Facility: HOSPITAL | Age: 28
Discharge: HOME | End: 2018-05-25
Attending: OBSTETRICS & GYNECOLOGY
Payer: COMMERCIAL

## 2018-05-25 VITALS
BODY MASS INDEX: 24 KG/M2 | WEIGHT: 128 LBS | DIASTOLIC BLOOD PRESSURE: 77 MMHG | SYSTOLIC BLOOD PRESSURE: 114 MMHG | HEART RATE: 82 BPM

## 2018-05-25 DIAGNOSIS — Z78.9 HISTORY OF FOREIGN TRAVEL: ICD-10-CM

## 2018-05-25 DIAGNOSIS — O99.891 ZIKA VIRUS EXPOSURE AFFECTING PREGNANCY: ICD-10-CM

## 2018-05-25 DIAGNOSIS — Z20.821 ZIKA VIRUS EXPOSURE AFFECTING PREGNANCY: ICD-10-CM

## 2018-05-25 PROCEDURE — 99241 OFFICE CONSULTATION,LEVEL I: CPT | Performed by: OBSTETRICS & GYNECOLOGY

## 2018-05-25 NOTE — PROGRESS NOTES
Consultation for possible zika exposure  [facility] travel end of October 2017   Denies bites or fevef chills rash for herself and

## 2018-06-04 ENCOUNTER — TELEPHONE (OUTPATIENT)
Dept: OBGYN CLINIC | Facility: CLINIC | Age: 28
End: 2018-06-04

## 2018-06-04 NOTE — TELEPHONE ENCOUNTER
Pt states she called earlier to see if she can safely eat quirino sauce from a jar. She was told she can as long as the milk products are pasteurized. She is about to use it and the ingredient lists says \"less than 2% of cesar cooking wine\".  Pt asking i

## 2018-06-04 NOTE — TELEPHONE ENCOUNTER
11w2d asking if ok to have/buy quirino sauce that has \"parmesan cheese\" as an ingredient. Pt advised to read the label carefully to make sure it is pasteurized. Informed not sure what are other ingredients the sauce she is buying will have.   Advised to

## 2018-06-18 ENCOUNTER — ROUTINE PRENATAL (OUTPATIENT)
Dept: OBGYN CLINIC | Facility: CLINIC | Age: 28
End: 2018-06-18

## 2018-06-18 VITALS
WEIGHT: 135 LBS | SYSTOLIC BLOOD PRESSURE: 103 MMHG | HEART RATE: 71 BPM | DIASTOLIC BLOOD PRESSURE: 69 MMHG | BODY MASS INDEX: 25 KG/M2

## 2018-06-18 DIAGNOSIS — Z34.91 ENCOUNTER FOR SUPERVISION OF NORMAL PREGNANCY IN FIRST TRIMESTER, UNSPECIFIED GRAVIDITY: Primary | ICD-10-CM

## 2018-06-18 PROCEDURE — 81002 URINALYSIS NONAUTO W/O SCOPE: CPT | Performed by: OBSTETRICS & GYNECOLOGY

## 2018-06-20 ENCOUNTER — TELEPHONE (OUTPATIENT)
Dept: OBGYN CLINIC | Facility: CLINIC | Age: 28
End: 2018-06-20

## 2018-06-20 NOTE — TELEPHONE ENCOUNTER
Pt stated she has an appt with her dentist today for a teeth cleaning and needs dental note faxed to office. Pt provided RN with dentist fax # ([phone], Maria Dolores Concepcion).  Pt informed letter has been printed and faxed to dentist. Pt verbalized Spencer

## 2018-06-20 NOTE — TELEPHONE ENCOUNTER
Pt is 13 wks prg and states she needs a note for the dentist stating what can and cannot be done.  pls adv

## 2018-06-28 ENCOUNTER — TELEPHONE (OUTPATIENT)
Dept: OBGYN CLINIC | Facility: CLINIC | Age: 28
End: 2018-06-28

## 2018-06-28 NOTE — TELEPHONE ENCOUNTER
14w5d states close family members have done \"first peak US earlier\" than 20wk to confirm sex. Wants to know if it is safe to do it now. States has a friend who did it at 14wks.   States there is no special reason other then wanting to know the sex earlie

## 2018-06-28 NOTE — TELEPHONE ENCOUNTER
Pt would like to know if it's safe for the baby to have a 3D U/S mom really would like to find out the gender now ?

## 2018-06-29 ENCOUNTER — TELEPHONE (OUTPATIENT)
Dept: OBGYN CLINIC | Facility: CLINIC | Age: 28
End: 2018-06-29

## 2018-06-29 NOTE — TELEPHONE ENCOUNTER
14 weeks pregnant, has been having pains when she pass gas, feels floated when she doesn't pass gas.

## 2018-06-29 NOTE — TELEPHONE ENCOUNTER
\"First Peak\" US is not recommend by our group nor by [employer]. These are not medical professionals doing US nor is it medically indicated. I would highly recommend it.  She should wait until her anatomy scan as health of the baby is most important and not gen

## 2018-07-11 ENCOUNTER — TELEPHONE (OUTPATIENT)
Dept: OBGYN CLINIC | Facility: CLINIC | Age: 28
End: 2018-07-11

## 2018-07-11 NOTE — TELEPHONE ENCOUNTER
ADVISED SHE WILL BE GIVEN THE ORDER TO SCHEDULE A FORMAL ULTRASOUND AT APPROX 20 WEEKS WHEN SHE IS AT HER APPT TOMORROW. PT VERBALIZED UNDERSTANDING.

## 2018-07-16 ENCOUNTER — TELEPHONE (OUTPATIENT)
Dept: OBGYN CLINIC | Facility: CLINIC | Age: 28
End: 2018-07-16

## 2018-07-16 ENCOUNTER — ROUTINE PRENATAL (OUTPATIENT)
Dept: OBGYN CLINIC | Facility: CLINIC | Age: 28
End: 2018-07-16

## 2018-07-16 VITALS
HEART RATE: 75 BPM | BODY MASS INDEX: 27 KG/M2 | SYSTOLIC BLOOD PRESSURE: 108 MMHG | WEIGHT: 143.19 LBS | DIASTOLIC BLOOD PRESSURE: 70 MMHG

## 2018-07-16 DIAGNOSIS — Z34.92 ENCOUNTER FOR SUPERVISION OF NORMAL PREGNANCY IN SECOND TRIMESTER, UNSPECIFIED GRAVIDITY: Primary | ICD-10-CM

## 2018-07-16 DIAGNOSIS — Z20.821 ZIKA VIRUS EXPOSURE: Primary | ICD-10-CM

## 2018-07-16 LAB
MULTISTIX LOT#: ABNORMAL NUMERIC
PH, URINE: 8.5 (ref 4.5–8)
SPECIFIC GRAVITY: 1 (ref 1–1.03)

## 2018-07-16 PROCEDURE — 81002 URINALYSIS NONAUTO W/O SCOPE: CPT | Performed by: OBSTETRICS & GYNECOLOGY

## 2018-07-16 NOTE — TELEPHONE ENCOUNTER
Needs [facility] MFM referral for the Level 2 US due to possible Zika exposure. She already had the consult in May. She'll need to schedule at 19-20 weeks.

## 2018-07-18 ENCOUNTER — TELEPHONE (OUTPATIENT)
Dept: OBGYN CLINIC | Facility: CLINIC | Age: 28
End: 2018-07-18

## 2018-07-18 NOTE — TELEPHONE ENCOUNTER
Informed pt that I discussed with LATESHA and she can clean house, but no harsh chemicals, clean in well ventilated area, doors and windows open, use rubber gloves and never mix chemicals.

## 2018-07-27 ENCOUNTER — TELEPHONE (OUTPATIENT)
Dept: OBGYN CLINIC | Facility: CLINIC | Age: 28
End: 2018-07-27

## 2018-07-27 NOTE — TELEPHONE ENCOUNTER
18 weeks pregnant has been having a headache for the past 2 days, per pt she thinks because she has not drink coffee in those 2 days, took 2 tylenol last night and today, would like to know if she can have her one cup of  Regular coffee ?

## 2018-07-27 NOTE — TELEPHONE ENCOUNTER
Pt confirms message below, states she has had a HA for 2 days and believes it is d/t lack of caffeine. She has not had her daily cup of coffee. Pt asking if it's OK to drink one cup of coffee. Pt denies any other symptoms, denies LOF, VB, and pain.   Inform

## 2018-08-04 ENCOUNTER — TELEPHONE (OUTPATIENT)
Dept: OBGYN CLINIC | Facility: CLINIC | Age: 28
End: 2018-08-04

## 2018-08-04 NOTE — TELEPHONE ENCOUNTER
Pt is 20 weeks, asking what kind of bug spray to use if outside today. Informed her there is no specific brand but since [place] in the area I would use a bug spray with DEET and shower before bed to rinse it off. Encouraged sun screen as well.  Pt naty

## 2018-08-05 NOTE — PROGRESS NOTES
Logan Fabry  Dear Dr. Roselyn Shea,     Thank you for requesting ultrasound evaluation and maternal fetal medicine consultation on your patient Nellie Meyers.   As you are aware she is a 28year old female  with a Single questions or concerns arise.       Jorge West M.D.     The majority of the time (>50%) was spent in review of records, consultation and coordination of care. Our discussion is summarized above. The approximate physician face-to-face time was 40 minutes.

## 2018-08-06 ENCOUNTER — HOSPITAL ENCOUNTER (OUTPATIENT)
Dept: PERINATAL CARE | Facility: HOSPITAL | Age: 28
Discharge: HOME | End: 2018-08-06
Attending: OBSTETRICS & GYNECOLOGY
Payer: COMMERCIAL

## 2018-08-06 VITALS
WEIGHT: 143 LBS | HEART RATE: 81 BPM | SYSTOLIC BLOOD PRESSURE: 110 MMHG | BODY MASS INDEX: 26.65 KG/M2 | HEIGHT: 61.5 IN | DIASTOLIC BLOOD PRESSURE: 71 MMHG

## 2018-08-06 DIAGNOSIS — O99.891 ZIKA VIRUS EXPOSURE AFFECTING PREGNANCY: ICD-10-CM

## 2018-08-06 DIAGNOSIS — Z20.821 ZIKA VIRUS EXPOSURE AFFECTING PREGNANCY: Primary | ICD-10-CM

## 2018-08-06 DIAGNOSIS — Z20.821 ZIKA VIRUS EXPOSURE AFFECTING PREGNANCY: ICD-10-CM

## 2018-08-06 DIAGNOSIS — O99.891 ZIKA VIRUS EXPOSURE AFFECTING PREGNANCY: Primary | ICD-10-CM

## 2018-08-06 PROCEDURE — 76811 OB US DETAILED SNGL FETUS: CPT | Performed by: OBSTETRICS & GYNECOLOGY

## 2018-08-06 PROCEDURE — 99215 OFFICE O/P EST HI 40 MIN: CPT | Performed by: OBSTETRICS & GYNECOLOGY

## 2018-08-06 RX ORDER — FAMOTIDINE 10 MG
10 TABLET ORAL 2 TIMES DAILY
Status: ON HOLD | COMMUNITY
End: 2018-12-23

## 2018-08-08 ENCOUNTER — HOSPITAL ENCOUNTER (EMERGENCY)
Facility: HOSPITAL | Age: 28
Discharge: HOME | End: 2018-08-08
Attending: EMERGENCY MEDICINE
Payer: COMMERCIAL

## 2018-08-08 ENCOUNTER — TELEPHONE (OUTPATIENT)
Dept: OBGYN CLINIC | Facility: CLINIC | Age: 28
End: 2018-08-08

## 2018-08-08 VITALS
SYSTOLIC BLOOD PRESSURE: 112 MMHG | TEMPERATURE: 98 F | DIASTOLIC BLOOD PRESSURE: 61 MMHG | RESPIRATION RATE: 18 BRPM | HEART RATE: 73 BPM | OXYGEN SATURATION: 100 %

## 2018-08-08 DIAGNOSIS — S80.212A ABRASION, LEFT KNEE, INITIAL ENCOUNTER: Primary | ICD-10-CM

## 2018-08-08 DIAGNOSIS — Z3A.20 20 WEEKS GESTATION OF PREGNANCY: ICD-10-CM
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
## 2018-08-08 PROCEDURE — 99284 EMERGENCY DEPT VISIT MOD MDM: CPT

## 2018-08-08 NOTE — ED INITIAL ASSESSMENT (HPI)
Pt states she tripped and fell today. Pt is 20 weeks pregnant and concerned she may have hit her stomach when she fell, Abrasion to lt knee, denies any discharge, bleeding or cramping.

## 2018-08-08 NOTE — TELEPHONE ENCOUNTER
Pt. fell getting off the train no vaginal bleeding, but she did scrape her knee. Pt is 20 weeks pregnant. Pt states that she held herself with her hand and knee. Pt. Wants to confirm that she will be ok?

## 2018-08-08 NOTE — TELEPHONE ENCOUNTER
PT FELL ON THE SIDEWALK AND SCRAPED HER HANDS AND HER KNEE. HER BELLY DID NOT HIT THE GROUND. PT IS HOME NOW AND STATES NO BLEEDING OR LEAKING AND FEELS OK BUT IS WORRIED ABOUT THE BABY. ADVISED THERE IS NOTHING OPEN TONIGHT AND SHOULD GO TO THE ER.   PT

## 2018-08-08 NOTE — ED PROVIDER NOTES
Patient Seen in: [facility] AND [facility] Emergency Department    History   Patient presents with:  Fall (musculoskeletal, neurologic)    Stated Complaint: +20 Weeks Preg, Fall, NO LOC, Denies Pain wants to get FHTs checked    HPI    Patient is a 30-year-old fe Abdominal: Soft. Bowel sounds are normal. She exhibits no distension. There is no tenderness. There is no rebound. Musculoskeletal: Normal range of motion. Legs:  Neurological: She is alert and oriented to person, place, and time.    Skin: Skin

## 2018-08-09 NOTE — TELEPHONE ENCOUNTER
Called pt for f/u. Pt went to ER last night and was told everything was ok and baby heart rate was 138. Informed pt that I will cancel appt this evening for FHT. Pt very appreciative and verbalized understanding.

## 2018-08-10 ENCOUNTER — TELEPHONE (OUTPATIENT)
Dept: OBGYN CLINIC | Facility: CLINIC | Age: 28
End: 2018-08-10

## 2018-08-10 NOTE — TELEPHONE ENCOUNTER
Pt looking for a note stating she kyle do [employer] duty states she hasnt been feeling well and cant sit that long.

## 2018-08-14 NOTE — TELEPHONE ENCOUNTER
Pt informed that message was sent to MD on call and we are waiting for response. Pt informed that message will be sent again to MD on call and we will call pt once we have a response.  Pt stated she is scheduled for jury duty on 9/4 and is wanting note to e

## 2018-08-14 NOTE — TELEPHONE ENCOUNTER
Pt informed of CAPs recs below and that MDs will discuss as group at meeting Friday morning. Pt verbalized understanding.

## 2018-08-15 ENCOUNTER — ROUTINE PRENATAL (OUTPATIENT)
Dept: OBGYN CLINIC | Facility: CLINIC | Age: 28
End: 2018-08-15
Payer: COMMERCIAL

## 2018-08-15 VITALS
DIASTOLIC BLOOD PRESSURE: 72 MMHG | WEIGHT: 155 LBS | HEART RATE: 85 BPM | SYSTOLIC BLOOD PRESSURE: 111 MMHG | BODY MASS INDEX: 29 KG/M2

## 2018-08-15 DIAGNOSIS — Z34.02 ENCOUNTER FOR SUPERVISION OF NORMAL FIRST PREGNANCY IN SECOND TRIMESTER: Primary | ICD-10-CM

## 2018-08-15 LAB
APPEARANCE: CLEAR
MULTISTIX LOT#: NORMAL NUMERIC

## 2018-08-15 PROCEDURE — 81002 URINALYSIS NONAUTO W/O SCOPE: CPT | Performed by: OBSTETRICS & GYNECOLOGY

## 2018-08-17 NOTE — TELEPHONE ENCOUNTER
LEONTCB. Per MD group discussion this morning, the doctors will not provide pt with a letter for jury duty.

## 2018-08-21 ENCOUNTER — ROUTINE PRENATAL (OUTPATIENT)
Dept: OBGYN CLINIC | Facility: CLINIC | Age: 28
End: 2018-08-21

## 2018-08-21 ENCOUNTER — TELEPHONE (OUTPATIENT)
Dept: OBGYN CLINIC | Facility: CLINIC | Age: 28
End: 2018-08-21

## 2018-08-21 VITALS
WEIGHT: 154 LBS | SYSTOLIC BLOOD PRESSURE: 112 MMHG | HEART RATE: 69 BPM | BODY MASS INDEX: 29 KG/M2 | DIASTOLIC BLOOD PRESSURE: 73 MMHG

## 2018-08-21 DIAGNOSIS — Z34.92 ENCOUNTER FOR SUPERVISION OF NORMAL PREGNANCY IN SECOND TRIMESTER, UNSPECIFIED GRAVIDITY: Primary | ICD-10-CM

## 2018-08-21 LAB
MULTISTIX LOT#: ABNORMAL NUMERIC
PH, URINE: 8.5 (ref 4.5–8)
SPECIFIC GRAVITY: 1 (ref 1–1.03)
UROBILINOGEN,SEMI-QN: 0.2 MG/DL (ref 0–1.9)

## 2018-08-21 PROCEDURE — 81002 URINALYSIS NONAUTO W/O SCOPE: CPT | Performed by: OBSTETRICS & GYNECOLOGY

## 2018-08-21 NOTE — TELEPHONE ENCOUNTER
LATESHA stated if there are any openings this evening to see if pt can be seen in office for eval. Pt accepted appt with KAYLYNN at [address] [facility] [place],[address].

## 2018-08-21 NOTE — PROGRESS NOTES
Problem visit for spotting. One spot of blood after wiping today. [place] on Sunday. No cramping or pain. No heavy bleeding. Spec exam no blood in vault and cervix closed. Bleeding precautions given. RTC as scheduled.

## 2018-08-21 NOTE — TELEPHONE ENCOUNTER
Pt 22w3d calling to report she noticed light pink spotting after wiping this afternoon. Pt stated there were a few drops of light pink blood toilet paper after urination.  Pt stated she waited about 10-15 minutes and went back to the bathroom again and wipe

## 2018-09-07 ENCOUNTER — TELEPHONE (OUTPATIENT)
Dept: OBGYN CLINIC | Facility: CLINIC | Age: 28
End: 2018-09-07

## 2018-09-07 NOTE — TELEPHONE ENCOUNTER
C/O LOW BACK PAIN THAT IS MAKING HER UNCOMFORTABLE. ASKING IF SHE CAN HAVE A PRENATAL MASSAGE? REASSURED HER SHE CAN DO THIS BUT MAKE SURE IT IS SOMEONE WHO IS TRAINED FOR PRENATAL MASSAGES.   ALSO RECOMMENDED ES TYLENOL, WARMTH ON LOW BACK, DOING LOW DALE

## 2018-09-17 ENCOUNTER — ROUTINE PRENATAL (OUTPATIENT)
Dept: OBGYN CLINIC | Facility: CLINIC | Age: 28
End: 2018-09-17

## 2018-09-17 ENCOUNTER — TELEPHONE (OUTPATIENT)
Dept: OBGYN CLINIC | Facility: CLINIC | Age: 28
End: 2018-09-17

## 2018-09-17 VITALS
DIASTOLIC BLOOD PRESSURE: 61 MMHG | HEART RATE: 66 BPM | BODY MASS INDEX: 30 KG/M2 | SYSTOLIC BLOOD PRESSURE: 98 MMHG | WEIGHT: 162 LBS

## 2018-09-17 DIAGNOSIS — Z34.02 ENCOUNTER FOR SUPERVISION OF NORMAL FIRST PREGNANCY IN SECOND TRIMESTER: Primary | ICD-10-CM

## 2018-09-17 PROBLEM — O26.00 EXCESSIVE WEIGHT GAIN DURING PREGNANCY, ANTEPARTUM (HCC): Status: ACTIVE | Noted: 2018-09-17

## 2018-09-17 PROBLEM — O26.00 EXCESSIVE WEIGHT GAIN DURING PREGNANCY, ANTEPARTUM: Status: ACTIVE | Noted: 2018-09-17

## 2018-09-17 LAB
APPEARANCE: CLEAR
MULTISTIX LOT#: NORMAL NUMERIC
URINE-COLOR: YELLOW

## 2018-09-17 PROCEDURE — 90686 IIV4 VACC NO PRSV 0.5 ML IM: CPT | Performed by: OBSTETRICS & GYNECOLOGY

## 2018-09-17 PROCEDURE — 90471 IMMUNIZATION ADMIN: CPT | Performed by: OBSTETRICS & GYNECOLOGY

## 2018-09-17 PROCEDURE — 81002 URINALYSIS NONAUTO W/O SCOPE: CPT | Performed by: OBSTETRICS & GYNECOLOGY

## 2018-09-17 NOTE — PROGRESS NOTES
Flu shot given in pt's left deltoid. Info sheet given. Consent signed and sent to scanning. Unable to chart on injection d/t Epic issue.  Message sent to [facility] to please assist.

## 2018-09-17 NOTE — TELEPHONE ENCOUNTER
Pt seen for PN appt with [facility]. Flu shot administered. When charting, [address] [place] [address] Rd will not let me enter the correct NDC. The only NDC's available are under Dwayne Mayfield and the  of this flu shot is ID biomedical. I think this is something epic will need to fix? I was unable to chart this injection. Routed to [facility] to please advise. LOT # [id number]  EXP 6/13/19  NDC # 12318-519-52  [employer]     Consent signed and sent to scanning.

## 2018-09-20 ENCOUNTER — TELEPHONE (OUTPATIENT)
Dept: OBGYN CLINIC | Facility: CLINIC | Age: 28
End: 2018-09-20

## 2018-09-20 ENCOUNTER — LAB ENCOUNTER (OUTPATIENT)
Dept: LAB | Facility: HOSPITAL | Age: 28
End: 2018-09-20
Attending: OBSTETRICS & GYNECOLOGY
Payer: COMMERCIAL

## 2018-09-20 DIAGNOSIS — R73.09 ELEVATED GLUCOSE TOLERANCE TEST: Primary | ICD-10-CM

## 2018-09-20 DIAGNOSIS — Z34.02 ENCOUNTER FOR SUPERVISION OF NORMAL FIRST PREGNANCY IN SECOND TRIMESTER: ICD-10-CM

## 2018-09-20 LAB
ERYTHROCYTE [DISTWIDTH] IN BLOOD BY AUTOMATED COUNT: 13.6 % (ref 11–15)
GLUCOSE 1H P 50 G GLC PO SERPL-MCNC: 151 MG/DL
HCT VFR BLD AUTO: 38.3 % (ref 35–48)
HGB BLD-MCNC: 13.1 G/DL (ref 12–16)
MCH RBC QN AUTO: 31.9 PG (ref 27–32)
MCHC RBC AUTO-ENTMCNC: 34.2 G/DL (ref 32–37)
MCV RBC AUTO: 93.2 FL (ref 80–100)
PLATELET # BLD AUTO: 263 K/UL (ref 140–400)
PMV BLD AUTO: 7.2 FL (ref 7.4–10.3)
RBC # BLD AUTO: 4.11 M/UL (ref 3.7–5.4)
WBC # BLD AUTO: 8.8 K/UL (ref 4–11)

## 2018-09-20 PROCEDURE — 85027 COMPLETE CBC AUTOMATED: CPT

## 2018-09-20 PROCEDURE — 36415 COLL VENOUS BLD VENIPUNCTURE: CPT

## 2018-09-20 PROCEDURE — 82950 GLUCOSE TEST: CPT

## 2018-09-20 NOTE — TELEPHONE ENCOUNTER
Pt asking if her results were back from the other blood work. Informed pt her hemoglobin results were 13.1 and that is normal. Pt verbalized understanding.

## 2018-09-22 ENCOUNTER — LAB ENCOUNTER (OUTPATIENT)
Dept: LAB | Facility: HOSPITAL | Age: 28
End: 2018-09-22
Attending: OBSTETRICS & GYNECOLOGY
Payer: COMMERCIAL

## 2018-09-22 DIAGNOSIS — R73.09 ELEVATED GLUCOSE TOLERANCE TEST: ICD-10-CM

## 2018-09-22 LAB
GLUCOSE 1H P GLC SERPL-MCNC: 127 MG/DL
GLUCOSE 2H P GLC SERPL-MCNC: 105 MG/DL
GLUCOSE 3H P GLC SERPL-MCNC: 103 MG/DL
GLUCOSE P FAST SERPL-MCNC: 84 MG/DL (ref 70–99)

## 2018-09-22 PROCEDURE — 36415 COLL VENOUS BLD VENIPUNCTURE: CPT

## 2018-09-22 PROCEDURE — 82951 GLUCOSE TOLERANCE TEST (GTT): CPT

## 2018-09-22 PROCEDURE — 82952 GTT-ADDED SAMPLES: CPT

## 2018-09-24 ENCOUNTER — TELEPHONE (OUTPATIENT)
Dept: OBGYN CLINIC | Facility: CLINIC | Age: 28
End: 2018-09-24

## 2018-09-24 NOTE — TELEPHONE ENCOUNTER
Pt asking what her values were for her 3 hour gtt. Pt informed of her values and what the normal ranges are. Pt verbalized understanding. Pt also asking if she will need to do another glucose test later on the in pregnancy.  Pt informed there are is further

## 2018-10-02 ENCOUNTER — ROUTINE PRENATAL (OUTPATIENT)
Dept: OBGYN CLINIC | Facility: CLINIC | Age: 28
End: 2018-10-02

## 2018-10-02 VITALS
SYSTOLIC BLOOD PRESSURE: 106 MMHG | BODY MASS INDEX: 30 KG/M2 | HEART RATE: 73 BPM | WEIGHT: 162 LBS | DIASTOLIC BLOOD PRESSURE: 71 MMHG

## 2018-10-02 DIAGNOSIS — Z34.93 ENCOUNTER FOR SUPERVISION OF NORMAL PREGNANCY IN THIRD TRIMESTER, UNSPECIFIED GRAVIDITY: Primary | ICD-10-CM

## 2018-10-02 PROCEDURE — 90471 IMMUNIZATION ADMIN: CPT | Performed by: OBSTETRICS & GYNECOLOGY

## 2018-10-02 PROCEDURE — 81002 URINALYSIS NONAUTO W/O SCOPE: CPT | Performed by: OBSTETRICS & GYNECOLOGY

## 2018-10-02 PROCEDURE — 90715 TDAP VACCINE 7 YRS/> IM: CPT | Performed by: OBSTETRICS & GYNECOLOGY

## 2018-10-02 NOTE — PROGRESS NOTES
Pt tolerated TDap vaccine well to left deltoid. Pt had no adverse reactions, VIS sheet given and signed consent form sent to scanning.

## 2018-10-08 ENCOUNTER — TELEPHONE (OUTPATIENT)
Dept: OBGYN CLINIC | Facility: CLINIC | Age: 28
End: 2018-10-08

## 2018-10-08 NOTE — TELEPHONE ENCOUNTER
Pt informed she can eat hotdogs during the pregnancy as long as its cooked to the steaming temperature. Pt verbalized understanding.

## 2018-10-15 ENCOUNTER — ROUTINE PRENATAL (OUTPATIENT)
Dept: OBGYN CLINIC | Facility: CLINIC | Age: 28
End: 2018-10-15

## 2018-10-15 VITALS
WEIGHT: 166 LBS | SYSTOLIC BLOOD PRESSURE: 101 MMHG | BODY MASS INDEX: 31 KG/M2 | DIASTOLIC BLOOD PRESSURE: 65 MMHG | HEART RATE: 73 BPM

## 2018-10-15 DIAGNOSIS — Z34.03 ENCOUNTER FOR SUPERVISION OF NORMAL FIRST PREGNANCY IN THIRD TRIMESTER: Primary | ICD-10-CM

## 2018-10-15 PROCEDURE — 81002 URINALYSIS NONAUTO W/O SCOPE: CPT | Performed by: OBSTETRICS & GYNECOLOGY

## 2018-10-24 ENCOUNTER — TELEPHONE (OUTPATIENT)
Dept: OBGYN CLINIC | Facility: CLINIC | Age: 28
End: 2018-10-24

## 2018-10-24 NOTE — TELEPHONE ENCOUNTER
C/O PT WAS TOLD SHE SHOULD ONLY BE SLEEPING ON HER LEFT SIDE. REASSURED PT SHE CAN SLEEP ON EITHER SIDE OR HER BACK, WHICHEVER IS MOST COMFORTABLE.  LAYING ON LEFT SIDE IS FOR EVALUATING UC'S OR FETAL MOVEMENT.   PT REASSURED.   ENCOURAGED TO CALL BACK WIT

## 2018-10-26 ENCOUNTER — TELEPHONE (OUTPATIENT)
Dept: OBGYN CLINIC | Facility: CLINIC | Age: 28
End: 2018-10-26

## 2018-10-26 NOTE — TELEPHONE ENCOUNTER
PER PT STATE SHE HAS A QUESTION FOR NURSE / PT WANT TO KNOW IF SHE COULD EAT PARMESAN CHEESE / GARRETT SAUCE / PLS ADV

## 2018-10-31 ENCOUNTER — TELEPHONE (OUTPATIENT)
Dept: OBGYN CLINIC | Facility: CLINIC | Age: 28
End: 2018-10-31

## 2018-11-01 ENCOUNTER — TELEPHONE (OUTPATIENT)
Dept: OBGYN CLINIC | Facility: CLINIC | Age: 28
End: 2018-11-01

## 2018-11-01 ENCOUNTER — ROUTINE PRENATAL (OUTPATIENT)
Dept: OBGYN CLINIC | Facility: CLINIC | Age: 28
End: 2018-11-01

## 2018-11-01 VITALS
SYSTOLIC BLOOD PRESSURE: 104 MMHG | DIASTOLIC BLOOD PRESSURE: 66 MMHG | WEIGHT: 170 LBS | HEART RATE: 78 BPM | BODY MASS INDEX: 32 KG/M2

## 2018-11-01 DIAGNOSIS — O26.00 EXCESSIVE WEIGHT GAIN AFFECTING PREGNANCY: Primary | ICD-10-CM

## 2018-11-01 DIAGNOSIS — Z34.93 ENCOUNTER FOR SUPERVISION OF NORMAL PREGNANCY IN THIRD TRIMESTER, UNSPECIFIED GRAVIDITY: Primary | ICD-10-CM

## 2018-11-01 PROCEDURE — 81002 URINALYSIS NONAUTO W/O SCOPE: CPT | Performed by: OBSTETRICS & GYNECOLOGY

## 2018-11-01 NOTE — TELEPHONE ENCOUNTER
ON CALL--Spoke with pt.  32 wks IUP. Had some B-H ctx today. Threw up since food did not agree with her. Did not drink as much as she normally does. Instructed pt to increase fluids and rest.  Call if more than 5 ctx/hr. Also discussed kick counts.   H

## 2018-11-12 ENCOUNTER — ROUTINE PRENATAL (OUTPATIENT)
Dept: OBGYN CLINIC | Facility: CLINIC | Age: 28
End: 2018-11-12

## 2018-11-12 VITALS
SYSTOLIC BLOOD PRESSURE: 105 MMHG | DIASTOLIC BLOOD PRESSURE: 68 MMHG | HEART RATE: 66 BPM | WEIGHT: 170 LBS | BODY MASS INDEX: 32 KG/M2

## 2018-11-12 DIAGNOSIS — Z34.83 ENCOUNTER FOR SUPERVISION OF OTHER NORMAL PREGNANCY IN THIRD TRIMESTER: Primary | ICD-10-CM

## 2018-11-12 PROCEDURE — 81002 URINALYSIS NONAUTO W/O SCOPE: CPT | Performed by: OBSTETRICS & GYNECOLOGY

## 2018-11-17 ENCOUNTER — APPOINTMENT (OUTPATIENT)
Dept: LAB | Facility: HOSPITAL | Age: 28
End: 2018-11-17
Attending: OBSTETRICS & GYNECOLOGY
Payer: COMMERCIAL

## 2018-11-17 ENCOUNTER — TELEPHONE (OUTPATIENT)
Dept: OBGYN CLINIC | Facility: CLINIC | Age: 28
End: 2018-11-17

## 2018-11-17 DIAGNOSIS — Z34.83 ENCOUNTER FOR SUPERVISION OF OTHER NORMAL PREGNANCY IN THIRD TRIMESTER: ICD-10-CM

## 2018-11-17 PROCEDURE — 87389 HIV-1 AG W/HIV-1&-2 AB AG IA: CPT

## 2018-11-17 PROCEDURE — 86780 TREPONEMA PALLIDUM: CPT

## 2018-11-17 PROCEDURE — 85027 COMPLETE CBC AUTOMATED: CPT

## 2018-11-17 PROCEDURE — 36415 COLL VENOUS BLD VENIPUNCTURE: CPT

## 2018-11-19 ENCOUNTER — TELEPHONE (OUTPATIENT)
Dept: OBGYN CLINIC | Facility: CLINIC | Age: 28
End: 2018-11-19

## 2018-11-26 ENCOUNTER — ROUTINE PRENATAL (OUTPATIENT)
Dept: OBGYN CLINIC | Facility: CLINIC | Age: 28
End: 2018-11-26

## 2018-11-26 VITALS
WEIGHT: 175 LBS | BODY MASS INDEX: 33 KG/M2 | HEART RATE: 70 BPM | DIASTOLIC BLOOD PRESSURE: 74 MMHG | SYSTOLIC BLOOD PRESSURE: 116 MMHG

## 2018-11-26 DIAGNOSIS — Z34.93 ENCOUNTER FOR SUPERVISION OF NORMAL PREGNANCY IN THIRD TRIMESTER, UNSPECIFIED GRAVIDITY: Primary | ICD-10-CM

## 2018-11-26 PROCEDURE — 81002 URINALYSIS NONAUTO W/O SCOPE: CPT | Performed by: OBSTETRICS & GYNECOLOGY

## 2018-12-03 ENCOUNTER — TELEPHONE (OUTPATIENT)
Dept: OBGYN CLINIC | Facility: CLINIC | Age: 28
End: 2018-12-03

## 2018-12-03 ENCOUNTER — ROUTINE PRENATAL (OUTPATIENT)
Dept: OBGYN CLINIC | Facility: CLINIC | Age: 28
End: 2018-12-03

## 2018-12-03 VITALS
HEART RATE: 82 BPM | DIASTOLIC BLOOD PRESSURE: 77 MMHG | SYSTOLIC BLOOD PRESSURE: 118 MMHG | WEIGHT: 178.81 LBS | BODY MASS INDEX: 33 KG/M2

## 2018-12-03 DIAGNOSIS — Z34.93 ENCOUNTER FOR SUPERVISION OF NORMAL PREGNANCY IN THIRD TRIMESTER, UNSPECIFIED GRAVIDITY: Primary | ICD-10-CM

## 2018-12-03 PROCEDURE — 81002 URINALYSIS NONAUTO W/O SCOPE: CPT | Performed by: OBSTETRICS & GYNECOLOGY

## 2018-12-04 ENCOUNTER — TELEPHONE (OUTPATIENT)
Dept: OBGYN CLINIC | Facility: CLINIC | Age: 28
End: 2018-12-04

## 2018-12-04 ENCOUNTER — ROUTINE PRENATAL (OUTPATIENT)
Dept: OBGYN CLINIC | Facility: CLINIC | Age: 28
End: 2018-12-04

## 2018-12-04 VITALS
DIASTOLIC BLOOD PRESSURE: 68 MMHG | SYSTOLIC BLOOD PRESSURE: 105 MMHG | WEIGHT: 179.19 LBS | BODY MASS INDEX: 33 KG/M2 | HEART RATE: 84 BPM

## 2018-12-04 DIAGNOSIS — Z34.93 ENCOUNTER FOR SUPERVISION OF NORMAL PREGNANCY IN THIRD TRIMESTER, UNSPECIFIED GRAVIDITY: Primary | ICD-10-CM

## 2018-12-04 PROCEDURE — 87210 SMEAR WET MOUNT SALINE/INK: CPT | Performed by: OBSTETRICS & GYNECOLOGY

## 2018-12-04 PROCEDURE — 81002 URINALYSIS NONAUTO W/O SCOPE: CPT | Performed by: OBSTETRICS & GYNECOLOGY

## 2018-12-04 NOTE — TELEPHONE ENCOUNTER
C/O BROWN DISCHARGE WITH SOME MUCOUS IN IT TODAY. C/O IRREGULAR UC'S SINCE Thursday. BELIEVES SHE HAD 4 OR 5 UC'S IN AN HOUR LAST NIGHT BUT ONLY ONCE. SOME LAST 40-50 SECONDS.   PT CONCERNED AB OUT WORKING DOWNTOWN AND NOT GETTING BACK TO [address] [place] [address]

## 2018-12-04 NOTE — TELEPHONE ENCOUNTER
Patient states she saw dr johnson today and her dilation was checked, patient concerned she is 37 weeks and passed a mucus of blood she wants to make sure it's not her plug, please advice.
Per LATESHA on call, ot to take it easy tonight, no intercourse, make sure she stays hydrated. If still bleeding in the morning, call for an appt. As for the leaking, if it has been going on for weeks, it is not amniotic fluid. Pt would be in labor.   Pt is
Pt was seen by CAP today and had an internal exam.  Pt states she went to bathroom at home and passed some bloody mucus. Pt states this happened twice, and the blood was the size of a kita in the toilet.   Pt states when wiping it is bright pink on the to
no

## 2018-12-04 NOTE — TELEPHONE ENCOUNTER
Pt. States that she is 37 1/2 weeks pregnant, and she is concerned about having brownish mucus discharge when she wipes. Pt states that she is having tightness in belly area.

## 2018-12-05 ENCOUNTER — TELEPHONE (OUTPATIENT)
Dept: OBGYN CLINIC | Facility: CLINIC | Age: 28
End: 2018-12-05

## 2018-12-05 NOTE — PROGRESS NOTES
Problem visit. Irregular ctx today. Passed mucus plug. Having leaking of fluid for some time. SSE--no fluid. Negative fern. Negative nitrazine. Labor precautions.

## 2018-12-06 NOTE — TELEPHONE ENCOUNTER
Complains of irregular contractions 2-12 minutes apart and moderately painful. She was seen yesterday for irregular contractions and found to be 1cm. These are slightly more uncomfortable. Feeling good fetal movement.  Reviewed painful contractions 5 minute

## 2018-12-10 ENCOUNTER — TELEPHONE (OUTPATIENT)
Dept: OBGYN CLINIC | Facility: CLINIC | Age: 28
End: 2018-12-10

## 2018-12-10 ENCOUNTER — HOSPITAL ENCOUNTER (OUTPATIENT)
Facility: HOSPITAL | Age: 28
Setting detail: OBSERVATION
Discharge: HOME | End: 2018-12-10
Attending: OBSTETRICS & GYNECOLOGY | Admitting: OBSTETRICS & GYNECOLOGY
Payer: COMMERCIAL

## 2018-12-10 VITALS — HEART RATE: 69 BPM | SYSTOLIC BLOOD PRESSURE: 119 MMHG | DIASTOLIC BLOOD PRESSURE: 67 MMHG

## 2018-12-10 PROBLEM — Z34.90 PREGNANCY (HCC): Status: ACTIVE | Noted: 2018-12-10

## 2018-12-10 PROBLEM — Z34.90 PREGNANCY: Status: ACTIVE | Noted: 2018-12-10

## 2018-12-10 PROCEDURE — 59025 FETAL NON-STRESS TEST: CPT | Performed by: OBSTETRICS & GYNECOLOGY

## 2018-12-10 RX ORDER — SODIUM CHLORIDE 0.9 % (FLUSH) 0.9 %
10 SYRINGE (ML) INJECTION AS NEEDED
Status: DISCONTINUED | OUTPATIENT
Start: 2018-12-10 | End: 2018-12-10

## 2018-12-10 NOTE — PROGRESS NOTES
Pt is a 28year old female admitted to TR3/TR3-A. Patient presents with:  Contractions     Pt is  38w2d intra-uterine pregnancy. History obtained, consents signed. Oriented to room, staff, and plan of care.

## 2018-12-10 NOTE — TRIAGE
[facility]      Triage Note    Hugo Rothman Patient Status:  Observation    1990 MRN [id number]   Location [address]  Attending Maco Garcia MD   Hosp Day # 0 BRENDA Carcamo.  DO Tammy visit: Pt to [facility] triage with c/o contractions/vaginal discharge. Speculum exam with mucous discharge noted. Yanet Dec not present. VE 1cm. Reactive NST. Dr Garcia notified. Pt discharged.   Verbal/written instructions s/s labor gven to pt with verbalized un d

## 2018-12-10 NOTE — TELEPHONE ENCOUNTER
38w2d. Pt states for the last hour her contractions have been 2-5 minutes apart and lasting 30 sec to 1 minute. She states they strong and rates them a 6-7 out of 10. Pt states that her underwear is wet, started one day ago. Pt also has discharge. Pt states that she has discharge and also wetness. Pt states that the baby is moving. Denies spotting/bleeding. Pt is downtown and she will come to [facility] to be evaluated. (Pt saw Irven Skains on 12-4- and her states stated irregular ctx today  Passed mucus plug. Having leaking of fluid for some time. SSE-no fluid, neg fern, neg Nitrazine. Labor precautions.)    Informed pt to go to [facility]. Informed [facility] that pt is coming to see them.   Paged LATESHA.

## 2018-12-11 ENCOUNTER — ROUTINE PRENATAL (OUTPATIENT)
Dept: OBGYN CLINIC | Facility: CLINIC | Age: 28
End: 2018-12-11

## 2018-12-11 VITALS
HEART RATE: 78 BPM | BODY MASS INDEX: 33 KG/M2 | SYSTOLIC BLOOD PRESSURE: 106 MMHG | DIASTOLIC BLOOD PRESSURE: 71 MMHG | WEIGHT: 180 LBS

## 2018-12-11 DIAGNOSIS — Z34.93 ENCOUNTER FOR SUPERVISION OF NORMAL PREGNANCY IN THIRD TRIMESTER, UNSPECIFIED GRAVIDITY: Primary | ICD-10-CM

## 2018-12-11 PROCEDURE — 81002 URINALYSIS NONAUTO W/O SCOPE: CPT | Performed by: OBSTETRICS & GYNECOLOGY

## 2018-12-11 NOTE — PROGRESS NOTES
Seen in [facility] yesterday for contractions. No further contractions. Reviewed kick counts and labor precautions.    RTC 1 wk

## 2018-12-18 ENCOUNTER — ROUTINE PRENATAL (OUTPATIENT)
Dept: OBGYN CLINIC | Facility: CLINIC | Age: 28
End: 2018-12-18

## 2018-12-18 ENCOUNTER — TELEPHONE (OUTPATIENT)
Dept: OBGYN CLINIC | Facility: CLINIC | Age: 28
End: 2018-12-18

## 2018-12-18 VITALS
DIASTOLIC BLOOD PRESSURE: 76 MMHG | HEART RATE: 76 BPM | BODY MASS INDEX: 34 KG/M2 | SYSTOLIC BLOOD PRESSURE: 112 MMHG | WEIGHT: 184 LBS

## 2018-12-18 DIAGNOSIS — Z34.93 ENCOUNTER FOR SUPERVISION OF NORMAL PREGNANCY IN THIRD TRIMESTER, UNSPECIFIED GRAVIDITY: Primary | ICD-10-CM

## 2018-12-18 PROCEDURE — 81002 URINALYSIS NONAUTO W/O SCOPE: CPT | Performed by: OBSTETRICS & GYNECOLOGY

## 2018-12-18 NOTE — TELEPHONE ENCOUNTER
PT DROPPED OFF SHORT TERM DISABILITY FORMS TO BE COMPLETED. PT SIGNED FCR AND SAMMY FORM. PT CONFIRMED ALL INFO. PT DID NOT MAKE $25 PYMT AT THE TIME OF DROP OFF. ALL FORMS SCANNED IN.

## 2018-12-19 NOTE — TELEPHONE ENCOUNTER
Disability form for Dr. Denae Osorio received in SAMMY+ FCR+ Signed release, billed $25. Logged for processing.  NK

## 2018-12-19 NOTE — TELEPHONE ENCOUNTER
Pt informed of Kimberli's note below in regards to SAMMY paperwork. Pt verbalized understanding. Pt provided with # to SAMMY if has further questions.

## 2018-12-21 ENCOUNTER — TELEPHONE (OUTPATIENT)
Dept: OBGYN CLINIC | Facility: CLINIC | Age: 28
End: 2018-12-21

## 2018-12-21 ENCOUNTER — ANESTHESIA (OUTPATIENT)
Dept: OBGYN UNIT | Facility: HOSPITAL | Age: 28
End: 2018-12-21
Payer: COMMERCIAL

## 2018-12-21 ENCOUNTER — HOSPITAL ENCOUNTER (INPATIENT)
Facility: HOSPITAL | Age: 28
LOS: 2 days | Discharge: HOME | End: 2018-12-23
Attending: OBSTETRICS & GYNECOLOGY | Admitting: OBSTETRICS & GYNECOLOGY
Payer: COMMERCIAL

## 2018-12-21 ENCOUNTER — ANESTHESIA EVENT (OUTPATIENT)
Dept: OBGYN UNIT | Facility: HOSPITAL | Age: 28
End: 2018-12-21
Payer: COMMERCIAL

## 2018-12-21 PROBLEM — Z37.9 NORMAL LABOR: Status: ACTIVE | Noted: 2018-12-21

## 2018-12-21 PROBLEM — Z37.9 NORMAL LABOR (HCC): Status: ACTIVE | Noted: 2018-12-21

## 2018-12-21 PROCEDURE — 59400 OBSTETRICAL CARE: CPT | Performed by: OBSTETRICS & GYNECOLOGY

## 2018-12-21 PROCEDURE — 0KQM0ZZ REPAIR PERINEUM MUSCLE, OPEN APPROACH: ICD-10-PCS | Performed by: OBSTETRICS & GYNECOLOGY

## 2018-12-21 RX ORDER — HYDROCODONE BITARTRATE AND ACETAMINOPHEN 5; 325 MG/1; MG/1
1 TABLET ORAL EVERY 6 HOURS PRN
Status: DISCONTINUED | OUTPATIENT
Start: 2018-12-21 | End: 2018-12-23

## 2018-12-21 RX ORDER — PHENYLEPHRINE HCL IN 0.9% NACL 0.5 MG/5ML
50 SYRINGE (ML) INTRAVENOUS ONCE
Status: COMPLETED | OUTPATIENT
Start: 2018-12-21 | End: 2018-12-21

## 2018-12-21 RX ORDER — IBUPROFEN 600 MG/1
600 TABLET ORAL EVERY 6 HOURS
Status: DISCONTINUED | OUTPATIENT
Start: 2018-12-21 | End: 2018-12-23

## 2018-12-21 RX ORDER — DEXTROSE, SODIUM CHLORIDE, SODIUM LACTATE, POTASSIUM CHLORIDE, AND CALCIUM CHLORIDE 5; .6; .31; .03; .02 G/100ML; G/100ML; G/100ML; G/100ML; G/100ML
INJECTION, SOLUTION INTRAVENOUS
Status: DISPENSED
Start: 2018-12-21 | End: 2018-12-22

## 2018-12-21 RX ORDER — DOCUSATE SODIUM 100 MG/1
100 CAPSULE, LIQUID FILLED ORAL 2 TIMES DAILY
Status: DISCONTINUED | OUTPATIENT
Start: 2018-12-21 | End: 2018-12-23

## 2018-12-21 RX ORDER — LIDOCAINE HYDROCHLORIDE AND EPINEPHRINE 15; 5 MG/ML; UG/ML
INJECTION, SOLUTION EPIDURAL AS NEEDED
Status: DISCONTINUED | OUTPATIENT
Start: 2018-12-21 | End: 2018-12-21 | Stop reason: SURG

## 2018-12-21 RX ORDER — LIDOCAINE HYDROCHLORIDE AND EPINEPHRINE 20; 5 MG/ML; UG/ML
20 INJECTION, SOLUTION EPIDURAL; INFILTRATION; INTRACAUDAL; PERINEURAL ONCE
Status: DISCONTINUED | OUTPATIENT
Start: 2018-12-21 | End: 2018-12-23

## 2018-12-21 RX ORDER — IBUPROFEN 600 MG/1
600 TABLET ORAL ONCE AS NEEDED
Status: DISCONTINUED | OUTPATIENT
Start: 2018-12-21 | End: 2018-12-21 | Stop reason: HOSPADM

## 2018-12-21 RX ORDER — SIMETHICONE 80 MG
80 TABLET,CHEWABLE ORAL 3 TIMES DAILY PRN
Status: DISCONTINUED | OUTPATIENT
Start: 2018-12-21 | End: 2018-12-23

## 2018-12-21 RX ORDER — AMMONIA INHALANTS 0.04 G/.3ML
0.3 INHALANT RESPIRATORY (INHALATION) AS NEEDED
Status: DISCONTINUED | OUTPATIENT
Start: 2018-12-21 | End: 2018-12-23

## 2018-12-21 RX ORDER — SODIUM CHLORIDE 0.9 % (FLUSH) 0.9 %
10 SYRINGE (ML) INJECTION AS NEEDED
Status: DISCONTINUED | OUTPATIENT
Start: 2018-12-21 | End: 2018-12-23

## 2018-12-21 RX ORDER — BISACODYL 10 MG
10 SUPPOSITORY, RECTAL RECTAL ONCE AS NEEDED
Status: DISCONTINUED | OUTPATIENT
Start: 2018-12-21 | End: 2018-12-23

## 2018-12-21 RX ORDER — LIDOCAINE HYDROCHLORIDE 10 MG/ML
INJECTION, SOLUTION EPIDURAL; INFILTRATION; INTRACAUDAL; PERINEURAL AS NEEDED
Status: DISCONTINUED | OUTPATIENT
Start: 2018-12-21 | End: 2018-12-21 | Stop reason: SURG

## 2018-12-21 RX ORDER — AMMONIA INHALANTS 0.04 G/.3ML
0.3 INHALANT RESPIRATORY (INHALATION) AS NEEDED
Status: DISCONTINUED | OUTPATIENT
Start: 2018-12-21 | End: 2018-12-21 | Stop reason: HOSPADM

## 2018-12-21 RX ORDER — SODIUM CHLORIDE, SODIUM LACTATE, POTASSIUM CHLORIDE, CALCIUM CHLORIDE 600; 310; 30; 20 MG/100ML; MG/100ML; MG/100ML; MG/100ML
INJECTION, SOLUTION INTRAVENOUS
Status: DISPENSED
Start: 2018-12-21 | End: 2018-12-22

## 2018-12-21 RX ORDER — ONDANSETRON 2 MG/ML
4 INJECTION INTRAMUSCULAR; INTRAVENOUS EVERY 6 HOURS PRN
Status: DISCONTINUED | OUTPATIENT
Start: 2018-12-21 | End: 2018-12-23

## 2018-12-21 RX ORDER — BUPIVACAINE HYDROCHLORIDE 2.5 MG/ML
30 INJECTION, SOLUTION EPIDURAL; INFILTRATION; INTRACAUDAL ONCE
Status: DISCONTINUED | OUTPATIENT
Start: 2018-12-21 | End: 2018-12-23

## 2018-12-21 RX ORDER — LIDOCAINE HYDROCHLORIDE 10 MG/ML
30 INJECTION, SOLUTION EPIDURAL; INFILTRATION; INTRACAUDAL; PERINEURAL ONCE
Status: DISCONTINUED | OUTPATIENT
Start: 2018-12-21 | End: 2018-12-21 | Stop reason: HOSPADM

## 2018-12-21 RX ORDER — NALBUPHINE HCL 10 MG/ML
2.5 AMPUL (ML) INJECTION
Status: DISCONTINUED | OUTPATIENT
Start: 2018-12-21 | End: 2018-12-23

## 2018-12-21 RX ORDER — BUPIVACAINE HYDROCHLORIDE 2.5 MG/ML
INJECTION, SOLUTION EPIDURAL; INFILTRATION; INTRACAUDAL AS NEEDED
Status: DISCONTINUED | OUTPATIENT
Start: 2018-12-21 | End: 2018-12-21 | Stop reason: SURG

## 2018-12-21 RX ORDER — DIAPER,BRIEF,INFANT-TODD,DISP
1 EACH MISCELLANEOUS EVERY 6 HOURS PRN
Status: DISCONTINUED | OUTPATIENT
Start: 2018-12-21 | End: 2018-12-23

## 2018-12-21 RX ORDER — TERBUTALINE SULFATE 1 MG/ML
0.25 INJECTION, SOLUTION SUBCUTANEOUS AS NEEDED
Status: DISCONTINUED | OUTPATIENT
Start: 2018-12-21 | End: 2018-12-21 | Stop reason: HOSPADM

## 2018-12-21 RX ORDER — PRENATAL VIT,CAL 76/IRON/FOLIC 29 MG-1 MG
1 TABLET ORAL DAILY
Status: DISCONTINUED | OUTPATIENT
Start: 2018-12-21 | End: 2018-12-23

## 2018-12-21 RX ORDER — ONDANSETRON 2 MG/ML
4 INJECTION INTRAMUSCULAR; INTRAVENOUS EVERY 6 HOURS PRN
Status: DISCONTINUED | OUTPATIENT
Start: 2018-12-21 | End: 2018-12-21 | Stop reason: HOSPADM

## 2018-12-21 RX ORDER — EPHEDRINE SULFATE/0.9% NACL/PF 25 MG/5 ML
5 SYRINGE (ML) INTRAVENOUS AS NEEDED
Status: DISCONTINUED | OUTPATIENT
Start: 2018-12-21 | End: 2018-12-23

## 2018-12-21 RX ORDER — SODIUM CHLORIDE, SODIUM LACTATE, POTASSIUM CHLORIDE, CALCIUM CHLORIDE 600; 310; 30; 20 MG/100ML; MG/100ML; MG/100ML; MG/100ML
INJECTION, SOLUTION INTRAVENOUS
Status: DISPENSED
Start: 2018-12-21 | End: 2018-12-21

## 2018-12-21 RX ORDER — SODIUM CHLORIDE 0.9 % (FLUSH) 0.9 %
10 SYRINGE (ML) INJECTION AS NEEDED
Status: DISCONTINUED | OUTPATIENT
Start: 2018-12-21 | End: 2018-12-21 | Stop reason: HOSPADM

## 2018-12-21 RX ORDER — TRISODIUM CITRATE DIHYDRATE AND CITRIC ACID MONOHYDRATE 500; 334 MG/5ML; MG/5ML
30 SOLUTION ORAL AS NEEDED
Status: DISCONTINUED | OUTPATIENT
Start: 2018-12-21 | End: 2018-12-21 | Stop reason: HOSPADM

## 2018-12-21 RX ADMIN — BUPIVACAINE HYDROCHLORIDE 2 MG: 2.5 INJECTION, SOLUTION EPIDURAL; INFILTRATION; INTRACAUDAL at 11:58:00

## 2018-12-21 RX ADMIN — LIDOCAINE HYDROCHLORIDE AND EPINEPHRINE 3 ML: 15; 5 INJECTION, SOLUTION EPIDURAL at 11:59:00

## 2018-12-21 RX ADMIN — LIDOCAINE HYDROCHLORIDE 5 ML: 10 INJECTION, SOLUTION EPIDURAL; INFILTRATION; INTRACAUDAL; PERINEURAL at 11:52:00

## 2018-12-21 NOTE — ANESTHESIA POSTPROCEDURE EVALUATION
Patient: Marily Lopez    Procedure Summary     Date:  12/21/18 Room / Location:      Anesthesia Start:  4688 Anesthesia Stop:  4065    Procedure:  LABOR ANALGESIA Diagnosis:      Scheduled Providers:   Anesthesiologist:  DO Ann Glaser

## 2018-12-21 NOTE — ANESTHESIA PREPROCEDURE EVALUATION
Anesthesia PreOp Note    HPI:     Floyd Crespo is a 28year old female who presents for preoperative consultation requested by: * No surgeons listed *    Date of Surgery: 12/21/2018    * No procedures listed *  Indication: * No pre-op diagnosis e mL Nasal PRN Dorothe Squire, DO     ondansetron HCl [facility]) injection 4 mg 4 mg Intravenous Q6H PRN Dorothe Squire, DO     lactated ringers infusion         lactated ringers IV bolus 1,000 mL 1,000 mL Intravenous Once Shae Jacobs, DO Main non-medical: Not on file    Occupational History      Not on file    Tobacco Use      Smoking status: Never Smoker      Smokeless tobacco: Never Used    Substance and Sexual Activity      Alcohol use: No        Alcohol/week: 0.0 oz        Frequency: Never informed Pancho Posadas and/or legal guardian or family member of the nature of the anesthetic plan, benefits, risks including possible dental damage if relevant, major complications, and any alternative forms of anesthetic management.    All of the

## 2018-12-21 NOTE — DISCHARGE SUMMARY
[facility]    Discharge Summary    Cheral Gun Patient Status:  Inpatient    1990 MRN [id number]   Location [address] [facility] Attending Sami Lim, [address] [facility] Day # 0       Admit date:

## 2018-12-21 NOTE — TELEPHONE ENCOUNTER
Pt 39w6d calling to report that she spoke with ERON around 6am about contractions and is calling back with update. Pt stated that for the past hour she has been having contractions every 3-5 minutes that last about 60 seconds \"or a little less\".  Pt rates

## 2018-12-21 NOTE — H&P
[address] Patient Status:  Inpatient    1990 MRN [id number]   Location [address]  Attending Alexandra Lugo, [address] [facility] Day # 0 PCP Chris Lyons capsule by mouth daily. Disp:  Rfl:  12/20/2018 at Unknown time   acetaminophen 500 MG Oral Tab Take 500 mg by mouth every 6 (six) hours as needed for Pain.  Disp:  Rfl:  Taking     Allergies: No Known Allergies    OBJECTIVE:    Temp:  [98.3 °F (36.8 °C)] 9

## 2018-12-21 NOTE — PROGRESS NOTES
12/21/2018, 12:48 PM     Went to bedside after epidural.  IUPC and fse placed due to decels during labor. At this time, patient became hypotensive and baby had 2 prolonged decels. Dr. Gallegos Child came in and pushed ephedrine.   Terb given and pt placed on hands

## 2018-12-21 NOTE — L&D DELIVERY NOTE
[facility]    Vaginal Delivery Note    Juvenal Diaz Patient Status:  Inpatient    1990 MRN [id number]   Location [address] Attending Trini Moore, [address] [facility] Day # 0 PCP Padmini Munoz

## 2018-12-21 NOTE — ANESTHESIA PROCEDURE NOTES
Labor Analgesia  Performed by: Cary Obrien DO  Authorized by: Cary Obrien DO     Patient Location:  OB  Start Time:  12/21/2018 11:49 AM  End Time:  12/21/2018 11:58 AM  Reason for Block: labor epidural    Anesthesiologist:  Cary Obrien DO  Perfor

## 2018-12-22 NOTE — PROGRESS NOTES
[facility]    OB/Gyne Post  Progress Note      Greg Roberts Patient Status:  Inpatient    1990 MRN [id number]   Location [facility] 3SE Attending Padmini Moore, [address] [facility] Day # 1 PCP Laura Santo.  DO Tammy MCH 31.8 27.0 - 32.0 pg    MCHC 34.4 32.0 - 37.0 g/dl    RDW 13.2 11.0 - 15.0 %     140 - 400 K/UL    MPV 7.3 (L) 7.4 - 10.3 fL    Neutrophil % 79 %    Lymphocyte % 14 %    Monocyte % 7 %    Eosinophil % 0 %    Basophil % 0 %    Neutrophil Absolu

## 2018-12-22 NOTE — PLAN OF CARE
Patient is stable, ambulating with steady gait, voiding  Well. Vital signs stable, breastfeeding well.

## 2018-12-23 VITALS
OXYGEN SATURATION: 99 % | WEIGHT: 179 LBS | DIASTOLIC BLOOD PRESSURE: 72 MMHG | HEIGHT: 61 IN | RESPIRATION RATE: 16 BRPM | SYSTOLIC BLOOD PRESSURE: 117 MMHG | BODY MASS INDEX: 33.79 KG/M2 | HEART RATE: 83 BPM | TEMPERATURE: 97 F

## 2018-12-23 NOTE — DISCHARGE PLANNING
Discharge order received from MD. Mom in stable condition. Discharge instructions given regarding pelvic rest, bleeding expectations, preeclampsia signs and symptoms, MD followup, incisional care, and pain medication.  ID band removed and verified against b

## 2018-12-23 NOTE — PROGRESS NOTES
Pt called RN insisting on giving infant formula. Per pt infant is fussy and pt is getting minimal milk from breasts.  RN explained to pt that this is normal - that her milk should be coming in within the next couple days and that the small amount pt is gett

## 2018-12-26 ENCOUNTER — TELEPHONE (OUTPATIENT)
Dept: OBGYN CLINIC | Facility: CLINIC | Age: 28
End: 2018-12-26

## 2018-12-26 NOTE — TELEPHONE ENCOUNTER
Informed pt of LATESHA's recs below. Informed pt to hydrate and elevation at rest. Informed pt to call if she is not better. Informed pt that LATESHA stated that she does not need to be seen today.

## 2018-12-26 NOTE — TELEPHONE ENCOUNTER
Admission reviewed and all BP's normal in PP time frame.  OK for instructions of hydration and elevation at rest.

## 2018-12-26 NOTE — TELEPHONE ENCOUNTER
Pt delivered 18 with RUPESH FRANCO. She states that for the last three days she has swelling in both legs, ankles and feet. She states that she has tingling in the bottom of her feet. She states that she can walk, but her legs are heavy.   Denies sob,

## 2018-12-27 ENCOUNTER — TELEPHONE (OUTPATIENT)
Dept: OBGYN CLINIC | Facility: CLINIC | Age: 28
End: 2018-12-27

## 2018-12-27 NOTE — TELEPHONE ENCOUNTER
Dr. Suleiman Mancia,    Please sign off on form:  -Highlight the patient and hit \"Chart\" button. -In Chart Review, w/in the Encounter tab - click 1 time on the Telephone call encounter for 12/18/18.  Scroll down the telephone encounter.  -Click \"scan on\" blue Hyp

## 2018-12-27 NOTE — TELEPHONE ENCOUNTER
Pt requesting letter stating date and type of delivery. Letter generated and sent via My Chart. Pt verbalized understanding.

## 2019-01-07 ENCOUNTER — TELEPHONE (OUTPATIENT)
Dept: OBGYN CLINIC | Facility: CLINIC | Age: 29
End: 2019-01-07

## 2019-01-07 NOTE — TELEPHONE ENCOUNTER
Pt had multiple questions when she can have IC and when can pt start exercising. Informed pt she needs to wait until PP appt and cleared from MD at that appt to resume exercising and IC. Pt verbalized understanding.

## 2019-01-14 ENCOUNTER — TELEPHONE (OUTPATIENT)
Dept: OBGYN CLINIC | Facility: CLINIC | Age: 29
End: 2019-01-14

## 2019-01-15 NOTE — TELEPHONE ENCOUNTER
DELIVERED 4 WEEKS AGO. C/O FEELING OVERWHELMED AND FREQUENTLY CRYING. SHE DOES NOT FEEL LIKE HARMING HERSELF OR THE BABY. 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   HE IS ALSO SICK RIGHT NOW SO NOT ABLE TO HELP WITH BABY AS MUCH THROUGH THE NI

## 2019-01-15 NOTE — TELEPHONE ENCOUNTER
Pt made an appt for Thursday with LATESHA.  Offered appts for today and tomorrow and pt declined. She could not make those appts. Pt made an appt for Thursday. (Sent to Elaine Bravo as a FYI.)    Sent a referral to [facility].   Pt aware to contact if she does not hear from

## 2019-01-15 NOTE — TELEPHONE ENCOUNTER
Patient needs to have referral for behavioral health as well as be seen in the office by a provider today or tomorrow

## 2019-01-16 ENCOUNTER — TELEPHONE (OUTPATIENT)
Dept: OBGYN CLINIC | Facility: CLINIC | Age: 29
End: 2019-01-16

## 2019-01-16 NOTE — TELEPHONE ENCOUNTER
Pt states that she would like an appt with LATESHA on Saturday, 1/19/19, instead of tomorrow. She denies being harmful to herself or anybody else. She states it is easier to keep the appt for Saturday with a .

## 2019-01-21 ENCOUNTER — POSTPARTUM (OUTPATIENT)
Dept: OBGYN CLINIC | Facility: CLINIC | Age: 29
End: 2019-01-21

## 2019-01-21 VITALS
DIASTOLIC BLOOD PRESSURE: 64 MMHG | WEIGHT: 162 LBS | SYSTOLIC BLOOD PRESSURE: 102 MMHG | HEART RATE: 63 BPM | BODY MASS INDEX: 31 KG/M2

## 2019-01-21 DIAGNOSIS — F41.9 ANXIETY: ICD-10-CM

## 2019-01-21 PROCEDURE — 99024 POSTOP FOLLOW-UP VISIT: CPT | Performed by: OBSTETRICS & GYNECOLOGY

## 2019-01-21 PROCEDURE — 99213 OFFICE O/P EST LOW 20 MIN: CPT | Performed by: OBSTETRICS & GYNECOLOGY

## 2019-01-21 NOTE — H&P
HPI    Floyd Crespo is a 33year old female  here for discussion regarding mood. Patient delivered 4 weeks ago via . Since delivery feeling very overwhelmed with the new addition to the family. NO SI/HI.   Doesn't feel like she is com Well nourished, well developed woman in no acute distress  Psych: mood appropriate      ASSESSMENT/PLAN  [facility] was seen today for postpartum care.     Diagnoses and all orders for this visit:    Postpartum state    Anxiety      Patient with post partum anx

## 2019-02-01 ENCOUNTER — POSTPARTUM (OUTPATIENT)
Dept: OBGYN CLINIC | Facility: CLINIC | Age: 29
End: 2019-02-01

## 2019-02-01 VITALS
SYSTOLIC BLOOD PRESSURE: 114 MMHG | WEIGHT: 161 LBS | DIASTOLIC BLOOD PRESSURE: 80 MMHG | HEART RATE: 78 BPM | BODY MASS INDEX: 30 KG/M2

## 2019-02-01 PROBLEM — Z37.9 NORMAL LABOR: Status: RESOLVED | Noted: 2018-12-21 | Resolved: 2019-02-01

## 2019-02-01 PROBLEM — O99.891 ZIKA VIRUS EXPOSURE AFFECTING PREGNANCY: Status: RESOLVED | Noted: 2018-05-25 | Resolved: 2019-02-01

## 2019-02-01 PROBLEM — O26.00 EXCESSIVE WEIGHT GAIN DURING PREGNANCY, ANTEPARTUM: Status: RESOLVED | Noted: 2018-09-17 | Resolved: 2019-02-01

## 2019-02-01 PROBLEM — Z37.9 NORMAL LABOR (HCC): Status: RESOLVED | Noted: 2018-12-21 | Resolved: 2019-02-01

## 2019-02-01 PROBLEM — Z34.90 PREGNANCY: Status: RESOLVED | Noted: 2018-12-10 | Resolved: 2019-02-01

## 2019-02-01 PROBLEM — Z34.90 PREGNANCY (HCC): Status: RESOLVED | Noted: 2018-12-10 | Resolved: 2019-02-01

## 2019-02-01 PROBLEM — O99.891 ZIKA VIRUS EXPOSURE AFFECTING PREGNANCY (HCC): Status: RESOLVED | Noted: 2018-05-25 | Resolved: 2019-02-01

## 2019-02-01 PROBLEM — Z20.821 ZIKA VIRUS EXPOSURE AFFECTING PREGNANCY: Status: RESOLVED | Noted: 2018-05-25 | Resolved: 2019-02-01

## 2019-02-01 PROBLEM — O26.00 EXCESSIVE WEIGHT GAIN DURING PREGNANCY, ANTEPARTUM (HCC): Status: RESOLVED | Noted: 2018-09-17 | Resolved: 2019-02-01

## 2019-02-01 PROBLEM — Z20.821 ZIKA VIRUS EXPOSURE AFFECTING PREGNANCY (HCC): Status: RESOLVED | Noted: 2018-05-25 | Resolved: 2019-02-01

## 2019-02-01 NOTE — PROGRESS NOTES
Greg Roberts is a 33year old female  here for 6 week post-partum visit. Patient delivered a  male infant on 18 via . Patient desires non hormonal BCM for contraception -- used ocps for years & feels related to hx SAB x 2.   P masses  External Genitalia:  normal appearance, hair distribution, and no lesions  Vagina:    normal appearance without lesions, no abnormal discharge  Cervix:    normal without tenderness on motion  Uterus:    normal in size, contour, position, mobility,

## 2019-02-25 ENCOUNTER — TELEPHONE (OUTPATIENT)
Dept: OBGYN CLINIC | Facility: CLINIC | Age: 29
End: 2019-02-25

## 2019-02-25 NOTE — TELEPHONE ENCOUNTER
PT HAD QUESTIONS ABOUT IUD'S. SHE IS AWARE PARAGARD HAS NO HORMONES AND ALL THE OTHERS DO.  SOME ARE 3 YEAR AND SOME ARE 5 YEAR. THEY ARE ALL LOW DOSE HORMONE. PT CHECKED WITH HER INSURANCE AND IT IS COVERED.   SHE THINKS SHE WILL LIKELY GO WITH MIRENA A

## 2019-03-15 NOTE — TELEPHONE ENCOUNTER
Pt states she started spotting today. Per protocol, [facility] considers the first day and the first day of a full flow. Pt instructed to call back when her period becomes a full flow. Pt expressed understanding.

## 2019-03-15 NOTE — TELEPHONE ENCOUNTER
Pt. wants to sched an appt. to get Mirena inserted. Pt. states that she started spotting today 3/15/19.

## 2019-03-16 ENCOUNTER — TELEPHONE (OUTPATIENT)
Dept: OBGYN CLINIC | Facility: CLINIC | Age: 29
End: 2019-03-16

## 2019-03-16 DIAGNOSIS — Z30.430 ENCOUNTER FOR INSERTION OF MIRENA IUD: Primary | ICD-10-CM

## 2019-03-16 NOTE — TELEPHONE ENCOUNTER
Pt stated that she started her period yesterday but was told to call once it was a full flow because yesterday was just spotting. Pt stated that late last night it became a full period flow.  Pt asking for an appt after 5:30pm and pt accepted appt with JOAN

## 2019-03-18 ENCOUNTER — OFFICE VISIT (OUTPATIENT)
Dept: OBGYN CLINIC | Facility: CLINIC | Age: 29
End: 2019-03-18

## 2019-03-18 ENCOUNTER — TELEPHONE (OUTPATIENT)
Dept: OBGYN CLINIC | Facility: CLINIC | Age: 29
End: 2019-03-18

## 2019-03-18 VITALS — DIASTOLIC BLOOD PRESSURE: 73 MMHG | SYSTOLIC BLOOD PRESSURE: 113 MMHG | HEART RATE: 62 BPM

## 2019-03-18 DIAGNOSIS — Z30.430 ENCOUNTER FOR INSERTION OF INTRAUTERINE CONTRACEPTIVE DEVICE: Primary | ICD-10-CM

## 2019-03-18 PROCEDURE — 58300 INSERT INTRAUTERINE DEVICE: CPT | Performed by: OBSTETRICS & GYNECOLOGY

## 2019-03-18 NOTE — TELEPHONE ENCOUNTER
Pt presented to clinic for Mirena IUD insertion. Mirena IUD insertion referral submitted now as STAT.

## 2019-03-18 NOTE — PROCEDURES
IUD Insertion     Birth control method(s) used:    LMP: 3/15/19    Consent signed. Procedure discussed with the patient in detail including indication, risks, benefits, alternatives and complications.     Pelvic Exam Findings:  Uterus: normal    Procedure:

## 2019-03-21 ENCOUNTER — TELEPHONE (OUTPATIENT)
Dept: OBGYN CLINIC | Facility: CLINIC | Age: 29
End: 2019-03-21

## 2019-03-21 NOTE — TELEPHONE ENCOUNTER
Pt had Mirena inserted on 3/18/19. Pt states she had IC on 3/20/19 and used a condom. Pt states [facility] informed pt to \"wait one week\". Pt states she took that as \"to use condoms for one week\".  Pt is asking if she is \"okay or do I need to come in for a ne

## 2019-05-24 ENCOUNTER — OFFICE VISIT (OUTPATIENT)
Dept: FAMILY MEDICINE CLINIC | Facility: CLINIC | Age: 29
End: 2019-05-24

## 2019-05-24 VITALS
HEIGHT: 61 IN | BODY MASS INDEX: 28.13 KG/M2 | DIASTOLIC BLOOD PRESSURE: 76 MMHG | RESPIRATION RATE: 18 BRPM | WEIGHT: 149 LBS | SYSTOLIC BLOOD PRESSURE: 114 MMHG | HEART RATE: 91 BPM

## 2019-05-24 DIAGNOSIS — S29.011A MUSCLE STRAIN OF CHEST WALL, INITIAL ENCOUNTER: Primary | ICD-10-CM

## 2019-05-24 PROCEDURE — 99212 OFFICE O/P EST SF 10 MIN: CPT | Performed by: FAMILY MEDICINE

## 2019-05-24 PROCEDURE — 99213 OFFICE O/P EST LOW 20 MIN: CPT | Performed by: FAMILY MEDICINE

## 2019-05-24 RX ORDER — HYDROCODONE BITARTRATE AND ACETAMINOPHEN 5; 325 MG/1; MG/1
TABLET ORAL
Qty: 20 TABLET | Refills: 0 | Status: SHIPPED | OUTPATIENT
Start: 2019-05-24 | End: 2019-11-04

## 2019-05-24 NOTE — PROGRESS NOTES
Blood pressure 114/76, pulse 91, resp. rate 18, height 5' 1\" (1.549 m), weight 149 lb (67.6 kg), not currently breastfeeding. Patient presents today complaining of pain under the right breast for the past 3 days.   Ibuprofen with minimal relief it can k

## 2019-06-20 ENCOUNTER — OFFICE VISIT (OUTPATIENT)
Dept: OBGYN CLINIC | Facility: CLINIC | Age: 29
End: 2019-06-20

## 2019-06-20 VITALS
HEART RATE: 69 BPM | DIASTOLIC BLOOD PRESSURE: 65 MMHG | SYSTOLIC BLOOD PRESSURE: 103 MMHG | BODY MASS INDEX: 27 KG/M2 | WEIGHT: 143 LBS

## 2019-06-20 DIAGNOSIS — Z01.419 ENCOUNTER FOR GYNECOLOGICAL EXAMINATION WITHOUT ABNORMAL FINDING: Primary | ICD-10-CM

## 2019-06-20 DIAGNOSIS — Z30.431 IUD CHECK UP: ICD-10-CM

## 2019-06-20 PROCEDURE — 99395 PREV VISIT EST AGE 18-39: CPT | Performed by: OBSTETRICS & GYNECOLOGY

## 2019-06-20 NOTE — PROGRESS NOTES
Juvenal Diaz is a 33year old female  No LMP recorded. (Menstrual status: IUD - Intrauterine Device). Patient presents with:  Gyn Exam: ANNUAL-- Mirena working well. xs anxiety with baby -- son now 8 mos old.   .    OBSTETRICS HISTORY:  OB Social connections:        Talks on phone: Not on file        Gets together: Not on file        Attends [employer] service: Not on file        Active member of club or organization: Not on file        Attends meetings of clubs or organizations: Not on file abnormal supraclavicular or axillary adenopathy is noted  Breast:   normal without palpable masses, tenderness, asymmetry, nipple discharge, nipple retraction or skin changes  Abdomen:   soft, nontender, nondistended, no masses  Skin/Hair:  no unusual rash

## 2019-11-04 ENCOUNTER — NURSE TRIAGE (OUTPATIENT)
Dept: FAMILY MEDICINE CLINIC | Facility: CLINIC | Age: 29
End: 2019-11-04

## 2019-11-04 ENCOUNTER — OFFICE VISIT (OUTPATIENT)
Dept: FAMILY MEDICINE CLINIC | Facility: CLINIC | Age: 29
End: 2019-11-04

## 2019-11-04 ENCOUNTER — HOSPITAL ENCOUNTER (OUTPATIENT)
Dept: GENERAL RADIOLOGY | Age: 29
Discharge: HOME | End: 2019-11-04
Attending: FAMILY MEDICINE
Payer: COMMERCIAL

## 2019-11-04 VITALS
HEART RATE: 62 BPM | TEMPERATURE: 98 F | WEIGHT: 134 LBS | SYSTOLIC BLOOD PRESSURE: 109 MMHG | HEIGHT: 61 IN | BODY MASS INDEX: 25.3 KG/M2 | DIASTOLIC BLOOD PRESSURE: 73 MMHG

## 2019-11-04 DIAGNOSIS — R07.81 RIB PAIN ON RIGHT SIDE: Primary | ICD-10-CM

## 2019-11-04 DIAGNOSIS — R07.81 RIB PAIN ON RIGHT SIDE: ICD-10-CM

## 2019-11-04 PROCEDURE — 71101 X-RAY EXAM UNILAT RIBS/CHEST: CPT | Performed by: FAMILY MEDICINE

## 2019-11-04 PROCEDURE — 99214 OFFICE O/P EST MOD 30 MIN: CPT | Performed by: FAMILY MEDICINE

## 2019-11-04 RX ORDER — MELOXICAM 7.5 MG/1
7.5 TABLET ORAL 2 TIMES DAILY PRN
Qty: 30 TABLET | Refills: 0 | Status: SHIPPED | OUTPATIENT
Start: 2019-11-04

## 2019-11-04 NOTE — TELEPHONE ENCOUNTER
Action Requested: Summary for Provider     []  Critical Lab, Recommendations Needed  [] Need Additional Advice  []   FYI    []   Need Orders  [] Need Medications Sent to Pharmacy  []  Other     SUMMARY: pt states she is having right sided rib pain, started

## 2019-11-04 NOTE — PROGRESS NOTES
Patient presents with:  Musculoskeletal Problem: c/o right sided rib pain w/ trouble taking deep breaths; denies injury    HPI:   Merlin Velasquez is a 33year old female who presents to clinic with complaints of right-sided rib pain that started fla right side, mid axillary line      ASSESSMENT AND PLAN:      Rib pain on right side  - Meloxicam 7.5 MG Oral Tab; Take 1 tablet (7.5 mg total) by mouth 2 (two) times daily as needed for Pain. Dispense: 30 tablet;  Refill: 0  - XR RIBS WITH CHEST (3 VIEWS),

## 2019-11-04 NOTE — TELEPHONE ENCOUNTER
Patient called in stating that she has right side rib pain. She states that it hurts with movement or with breathing. She states that due to the pain it's a little harder to breath.      She experienced this earlier in the year and was told they could be br

## 2019-12-19 NOTE — TELEPHONE ENCOUNTER
Reviewed message with SHU (on call). SHU stated that since pt saw Kitty Jade in January for miscarriage, add pt to his schedule today. Pt stated she needs to check with her supervisor at work to see if she can come in for appt.  When pt calls back, PLEASE SCHEDULE Rhomboid Transposition Flap Text: The defect edges were debeveled with a #15 scalpel blade.  Given the location of the defect and the proximity to free margins a rhomboid transposition flap was deemed most appropriate.  Using a sterile surgical marker, an appropriate rhomboid flap was drawn incorporating the defect.    The area thus outlined was incised deep to adipose tissue with a #15 scalpel blade.  The skin margins were undermined to an appropriate distance in all directions utilizing iris scissors.

## 2020-05-01 ENCOUNTER — TELEPHONE (OUTPATIENT)
Dept: OBGYN CLINIC | Facility: CLINIC | Age: 30
End: 2020-05-01

## 2020-05-01 NOTE — TELEPHONE ENCOUNTER
Pt asking when she had IUD inserted and if it is common to not get menses with Mirena. Informed pt she had Mirena inserted on 3/18/2019. Pt reports menses \"very light and sometimes I don't even get one.  Informed pt is it common to have light or no menses

## 2020-06-11 ENCOUNTER — TELEPHONE (OUTPATIENT)
Dept: FAMILY MEDICINE CLINIC | Facility: CLINIC | Age: 30
End: 2020-06-11

## 2020-06-11 NOTE — TELEPHONE ENCOUNTER
Dr. Damian Celestin, patient is schedule for a Physical on 06/23/2020. Patient is requesting blood test order for appointment. Please advise.

## 2020-06-11 NOTE — TELEPHONE ENCOUNTER
Please inform patient that we will order appropriate labs at her annual visit. I believe her  also called with the same question.

## 2020-06-23 ENCOUNTER — OFFICE VISIT (OUTPATIENT)
Dept: FAMILY MEDICINE CLINIC | Facility: CLINIC | Age: 30
End: 2020-06-23

## 2020-06-23 ENCOUNTER — LAB ENCOUNTER (OUTPATIENT)
Dept: LAB | Age: 30
End: 2020-06-23
Attending: FAMILY MEDICINE
Payer: COMMERCIAL

## 2020-06-23 VITALS
HEIGHT: 61 IN | SYSTOLIC BLOOD PRESSURE: 99 MMHG | TEMPERATURE: 98 F | DIASTOLIC BLOOD PRESSURE: 65 MMHG | WEIGHT: 127 LBS | BODY MASS INDEX: 23.98 KG/M2 | HEART RATE: 60 BPM

## 2020-06-23 DIAGNOSIS — Z00.00 ANNUAL PHYSICAL EXAM: ICD-10-CM

## 2020-06-23 DIAGNOSIS — Z00.00 ANNUAL PHYSICAL EXAM: Primary | ICD-10-CM

## 2020-06-23 PROCEDURE — 82607 VITAMIN B-12: CPT

## 2020-06-23 PROCEDURE — 99395 PREV VISIT EST AGE 18-39: CPT | Performed by: FAMILY MEDICINE

## 2020-06-23 PROCEDURE — 85025 COMPLETE CBC W/AUTO DIFF WBC: CPT

## 2020-06-23 PROCEDURE — 80061 LIPID PANEL: CPT

## 2020-06-23 PROCEDURE — 82306 VITAMIN D 25 HYDROXY: CPT

## 2020-06-23 PROCEDURE — 83036 HEMOGLOBIN GLYCOSYLATED A1C: CPT

## 2020-06-23 PROCEDURE — 80053 COMPREHEN METABOLIC PANEL: CPT

## 2020-06-23 PROCEDURE — 84443 ASSAY THYROID STIM HORMONE: CPT

## 2020-06-23 PROCEDURE — 36415 COLL VENOUS BLD VENIPUNCTURE: CPT

## 2020-06-23 NOTE — PROGRESS NOTES
HPI:   Ivan Chiu is a 26year old female who presents for a complete physical exam.    Work:  homemaker  Social: Lives with  and 18 month ol  Diet: eats home cooked meals  Exercise: 3 walks a day. GYN history:   LMP: 2 weeks ago. itching,irregular menses  MUSCULOSKELETAL: denies back pain  NEURO: denies headaches  PSYCHE: denies depression or anxiety  HEMATOLOGIC: denies hx of anemia or easy bruising  ENDOCRINE: denies weight changes  ALL/ASTHMA: denies hx of allergy or asthma    E

## 2020-06-23 NOTE — PATIENT INSTRUCTIONS
Prevention Guidelines, Women Ages 25 to 44  Screening tests and vaccines are an important part of managing your health. A screening test is done to find possible disorders or diseases in people who don't have any symptoms.  The goal is to find a disease ear diabetes Lifelong testing every 3 years   Type 2 diabetes All women with prediabetes Every year   Gonorrhea Sexually active women at increased risk for infection At routine exams   Hepatitis C Anyone at increased risk At routine exams   HIV All women cm Meningococcal Women at increased risk for infection should talk with their healthcare provider 1 or more doses   Pneumococcal conjugate vaccine (PCV13) and pneumococcal polysaccharide vaccine (PPSV23) Women at increased risk for infection should talk wit

## 2020-07-21 ENCOUNTER — TELEPHONE (OUTPATIENT)
Dept: FAMILY MEDICINE CLINIC | Facility: CLINIC | Age: 30
End: 2020-07-21

## 2020-07-21 NOTE — TELEPHONE ENCOUNTER
My chart communication sent to patient. Can discontinue Nexium and use on a as needed basis. Patient to avoid aggravating foods.

## 2020-07-21 NOTE — TELEPHONE ENCOUNTER
LOV 6/23/2020. Reports that she has been taking Nexium for over 2 weeks as you advised at [facility]. She is asking if she should continue taking this. States she is feeling better and it is helping with GERD like symptoms.   She is asking for any other suggesti

## 2020-12-10 ENCOUNTER — TELEPHONE (OUTPATIENT)
Dept: FAMILY MEDICINE CLINIC | Facility: CLINIC | Age: 30
End: 2020-12-10

## 2020-12-10 DIAGNOSIS — K01.1 IMPACTED TOOTH: ICD-10-CM

## 2020-12-10 DIAGNOSIS — K08.9 TEETH PROBLEM: Primary | ICD-10-CM

## 2020-12-10 NOTE — TELEPHONE ENCOUNTER
Patient called and advised that she just spoke to an Oral Surgeon's office. She was advised she will need a referral to come see them.      Needs a Referral for Orthodontist. [facility]       Please Advise

## 2021-03-11 NOTE — PROGRESS NOTES
C/o rash on her both buttocks- looks like eczema but she has no history of this- rec steroid cream Double O-Z Plasty Text: The defect edges were debeveled with a #15 scalpel blade.  Given the location of the defect, shape of the defect and the proximity to free margins a Double O-Z plasty (double transposition flap) was deemed most appropriate.  Using a sterile surgical marker, the appropriate transposition flaps were drawn incorporating the defect and placing the expected incisions within the relaxed skin tension lines where possible. The area thus outlined was incised deep to adipose tissue with a #15 scalpel blade.  The skin margins were undermined to an appropriate distance in all directions utilizing iris scissors.  Hemostasis was achieved with electrocautery.  The flaps were then transposed into place, one clockwise and the other counterclockwise, and anchored with interrupted buried subcutaneous sutures.

## 2021-05-01 ENCOUNTER — OFFICE VISIT (OUTPATIENT)
Dept: OBGYN CLINIC | Facility: CLINIC | Age: 31
End: 2021-05-01

## 2021-05-01 VITALS
BODY MASS INDEX: 23.22 KG/M2 | WEIGHT: 123 LBS | SYSTOLIC BLOOD PRESSURE: 103 MMHG | HEART RATE: 65 BPM | DIASTOLIC BLOOD PRESSURE: 64 MMHG | HEIGHT: 61 IN

## 2021-05-01 DIAGNOSIS — Z01.419 ENCOUNTER FOR ANNUAL ROUTINE GYNECOLOGICAL EXAMINATION: Primary | ICD-10-CM

## 2021-05-01 DIAGNOSIS — Z30.431 IUD CHECK UP: ICD-10-CM

## 2021-05-01 PROCEDURE — 3078F DIAST BP <80 MM HG: CPT | Performed by: OBSTETRICS & GYNECOLOGY

## 2021-05-01 PROCEDURE — 3008F BODY MASS INDEX DOCD: CPT | Performed by: OBSTETRICS & GYNECOLOGY

## 2021-05-01 PROCEDURE — 3074F SYST BP LT 130 MM HG: CPT | Performed by: OBSTETRICS & GYNECOLOGY

## 2021-05-01 PROCEDURE — 99395 PREV VISIT EST AGE 18-39: CPT | Performed by: OBSTETRICS & GYNECOLOGY

## 2021-05-01 NOTE — PROGRESS NOTES
Karoline Villarreal is a 31year old female  No LMP recorded. (Menstrual status: IUD - Intrauterine Device). Patient presents with:  Gyn Exam: ANNUAL EXAM. Last seen in 2019. No change in hx  .     OBSTETRICS HISTORY:  OB History    Para Te denies chest pain or palpitations  Respiratory:    denies shortness of breath  Gastrointestinal:  denies severe abdominal pain, frequent diarrhea or constipation, nausea / vomiting  Genitourinary:    denies dysuria, bothersome incontinence  Skin/Breast: THINPREP PAP SMEAR B  -     HPV HIGH RISK , THIN PREP COLLECTION    IUD check up      Pap w/ HPV done. Declines STD screening. Annual visits. Return in about 1 year (around 5/1/2022) for annual gyne exam, IUD check.

## 2021-08-16 ENCOUNTER — OFFICE VISIT (OUTPATIENT)
Dept: FAMILY MEDICINE CLINIC | Facility: CLINIC | Age: 31
End: 2021-08-16

## 2021-08-16 VITALS
DIASTOLIC BLOOD PRESSURE: 77 MMHG | SYSTOLIC BLOOD PRESSURE: 115 MMHG | BODY MASS INDEX: 22.84 KG/M2 | WEIGHT: 121 LBS | HEIGHT: 61 IN | TEMPERATURE: 98 F | HEART RATE: 69 BPM

## 2021-08-16 DIAGNOSIS — R49.8 VOICE STRAIN: Primary | ICD-10-CM

## 2021-08-16 PROCEDURE — 99213 OFFICE O/P EST LOW 20 MIN: CPT | Performed by: FAMILY MEDICINE

## 2021-08-16 PROCEDURE — 3074F SYST BP LT 130 MM HG: CPT | Performed by: FAMILY MEDICINE

## 2021-08-16 PROCEDURE — 3008F BODY MASS INDEX DOCD: CPT | Performed by: FAMILY MEDICINE

## 2021-08-16 PROCEDURE — 3078F DIAST BP <80 MM HG: CPT | Performed by: FAMILY MEDICINE

## 2021-08-16 NOTE — PROGRESS NOTES
Patient presents with:  Voice Problem: concerned w/ voice cracking, denies pain or feeling sick    HPI:   Johanna Mckoy is a 31year old female who presents to clinic with complaints of voice cracking for the past month.    Not a/w sore throat, f/c

## 2021-08-30 ENCOUNTER — OFFICE VISIT (OUTPATIENT)
Dept: OTOLARYNGOLOGY | Facility: CLINIC | Age: 31
End: 2021-08-30

## 2021-08-30 DIAGNOSIS — J38.2 VOCAL CORD NODULES: Primary | ICD-10-CM

## 2021-08-30 PROCEDURE — 99243 OFF/OP CNSLTJ NEW/EST LOW 30: CPT | Performed by: OTOLARYNGOLOGY

## 2021-08-30 NOTE — PROGRESS NOTES
Abhilash Osborne is a 31year old female. Patient presents with:  Throat Problem: Hoarseness      HISTORY OF PRESENT ILLNESS  8/30/2021  Patient presents for evaluation of hoarseness  This has been going on for months.   It is not  associated with: intolerance. Neuro Negative Tremors. Psych Negative Anxiety and depression. Integumentary Negative Frequent skin infections, pigment change and rash. Hema/Lymph Negative Easy bleeding and easy bruising.            PHYSICAL EXAM    There were no aishwarya non enlarged and No intranasal polyps were noted. Nasopharynx was free of masses and chelsey were patent. Oropharynx was then examined and the patient has a normal tongue base and lingual tonsils.  Epiglottis was  true and false cords were arytenoids and pyrif

## 2021-10-13 NOTE — TELEPHONE ENCOUNTER
Discussed results with CAP. Pt states pt should have US today to r/o ectopic pregnancy. CAP recommends pt go to ER today for US. Pt advised to call and follow up with us on Monday for US results and recommendations. Pt verbalizes understanding. Tomás Mclean is a 70 year old male presenting to [facility] for labs and Retacrit injection, patient does not meet parameters for injection and injection held.     Patient states he is feeling well and has no complaints today.     WBC (K/mcL)   Date Value   10/13/2021 5.6     RBC (mil/mcL)   Date Value   10/13/2021 4.28 (L)     HCT (%)   Date Value   10/13/2021 37.4 (L)     HGB (g/dL)   Date Value   10/13/2021 11.4 (L)     PLT (K/mcL)   Date Value   10/13/2021 205     Dr. Simmons is supervising provider today.     Future Appointments   Date Time Provider Department Center   10/14/2021  8:50 AM Penny Rea NP [id number]   10/20/2021 12:00 PM [place] [id number] [id number]    10/20/2021 12:15 PM Rocky Simmons MD [address]   10/21/2021  9:10 AM [facility]    10/21/2021 11:10 AM [facility]    1/21/2022 11:30 AM [facility]      Patient discharged to home per self ambulatory.

## 2022-09-29 ENCOUNTER — TELEMEDICINE (OUTPATIENT)
Dept: FAMILY MEDICINE CLINIC | Facility: CLINIC | Age: 32
End: 2022-09-29

## 2022-09-29 DIAGNOSIS — R11.2 NAUSEA AND VOMITING IN ADULT: Primary | ICD-10-CM

## 2022-09-29 RX ORDER — ONDANSETRON 4 MG/1
4 TABLET, FILM COATED ORAL EVERY 8 HOURS PRN
Qty: 20 TABLET | Refills: 0 | Status: SHIPPED | OUTPATIENT
Start: 2022-09-29

## 2022-09-29 NOTE — PROGRESS NOTES
not currently breastfeeding. Video visit    Presents today complaining of nausea and vomiting began at 4 AM today. No diarrhea. No recent foreign travel son has been sick with the same thing has IUD. No recent antibiotics. Objective patient comfortable no apparent distress    Assessment vomiting and nausea    Plan Zofran prescription risks and benefits explained. Patient to go to ER if fever, intractable vomiting or severe abdominal pain.

## 2022-12-01 ENCOUNTER — HOSPITAL ENCOUNTER (OUTPATIENT)
Age: 32
Discharge: HOME | End: 2022-12-01
Payer: COMMERCIAL

## 2022-12-01 VITALS
OXYGEN SATURATION: 100 % | RESPIRATION RATE: 20 BRPM | DIASTOLIC BLOOD PRESSURE: 80 MMHG | SYSTOLIC BLOOD PRESSURE: 119 MMHG | TEMPERATURE: 98 F | HEART RATE: 67 BPM

## 2022-12-01 DIAGNOSIS — H10.33 ACUTE CONJUNCTIVITIS OF BOTH EYES, UNSPECIFIED ACUTE CONJUNCTIVITIS TYPE: Primary | ICD-10-CM

## 2022-12-01 PROCEDURE — 99213 OFFICE O/P EST LOW 20 MIN: CPT

## 2022-12-01 PROCEDURE — 99203 OFFICE O/P NEW LOW 30 MIN: CPT

## 2022-12-01 RX ORDER — OFLOXACIN 3 MG/ML
2 SOLUTION/ DROPS OPHTHALMIC 4 TIMES DAILY
Qty: 10 ML | Refills: 0 | Status: SHIPPED | OUTPATIENT
Start: 2022-12-01 | End: 2022-12-08

## 2022-12-01 NOTE — ED INITIAL ASSESSMENT (HPI)
Bilateral eye redness with crusty drainage since yesterday, occ contact lens use, denies recent trauma or injury, no blurry vision

## 2022-12-01 NOTE — DISCHARGE INSTRUCTIONS
Start antibiotic drops as prescribed do not wear your contacts you may wear your glasses. If you develop outer eye swelling redness or warmth pus coming from the eye fevers severe headache nausea or vomiting go to the nearest emergency department.

## 2023-02-09 ENCOUNTER — OFFICE VISIT (OUTPATIENT)
Dept: OBGYN CLINIC | Facility: CLINIC | Age: 33
End: 2023-02-09

## 2023-02-09 VITALS
BODY MASS INDEX: 24 KG/M2 | WEIGHT: 126.63 LBS | HEART RATE: 69 BPM | SYSTOLIC BLOOD PRESSURE: 106 MMHG | DIASTOLIC BLOOD PRESSURE: 71 MMHG

## 2023-02-09 DIAGNOSIS — Z01.419 WELL WOMAN EXAM WITH ROUTINE GYNECOLOGICAL EXAM: Primary | ICD-10-CM

## 2023-02-09 DIAGNOSIS — Z97.5 PRESENCE OF IUD: ICD-10-CM

## 2023-02-09 PROCEDURE — 99395 PREV VISIT EST AGE 18-39: CPT | Performed by: NURSE PRACTITIONER

## 2023-02-09 PROCEDURE — 3078F DIAST BP <80 MM HG: CPT | Performed by: NURSE PRACTITIONER

## 2023-02-09 PROCEDURE — 3074F SYST BP LT 130 MM HG: CPT | Performed by: NURSE PRACTITIONER

## 2023-03-31 ENCOUNTER — LAB ENCOUNTER (OUTPATIENT)
Dept: LAB | Age: 33
End: 2023-03-31
Attending: FAMILY MEDICINE
Payer: COMMERCIAL

## 2023-03-31 ENCOUNTER — OFFICE VISIT (OUTPATIENT)
Dept: FAMILY MEDICINE CLINIC | Facility: CLINIC | Age: 33
End: 2023-03-31

## 2023-03-31 VITALS
HEART RATE: 57 BPM | SYSTOLIC BLOOD PRESSURE: 106 MMHG | WEIGHT: 126 LBS | BODY MASS INDEX: 24 KG/M2 | DIASTOLIC BLOOD PRESSURE: 71 MMHG | TEMPERATURE: 99 F

## 2023-03-31 DIAGNOSIS — E61.1 IRON DEFICIENCY: ICD-10-CM

## 2023-03-31 DIAGNOSIS — Z00.00 ENCOUNTER FOR ANNUAL HEALTH EXAMINATION: Primary | ICD-10-CM

## 2023-03-31 DIAGNOSIS — E55.9 VITAMIN D DEFICIENCY: ICD-10-CM

## 2023-03-31 DIAGNOSIS — Z00.00 ENCOUNTER FOR ANNUAL HEALTH EXAMINATION: ICD-10-CM

## 2023-03-31 LAB
ALBUMIN SERPL-MCNC: 3.8 G/DL (ref 3.4–5)
ALBUMIN/GLOB SERPL: 1.2 {RATIO} (ref 1–2)
ALP LIVER SERPL-CCNC: 42 U/L
ALT SERPL-CCNC: 19 U/L
ANION GAP SERPL CALC-SCNC: 4 MMOL/L (ref 0–18)
AST SERPL-CCNC: 11 U/L (ref 15–37)
BASOPHILS # BLD AUTO: 0.1 X10(3) UL (ref 0–0.2)
BASOPHILS NFR BLD AUTO: 1.8 %
BILIRUB SERPL-MCNC: 0.6 MG/DL (ref 0.1–2)
BUN BLD-MCNC: 10 MG/DL (ref 7–18)
BUN/CREAT SERPL: 18.2 (ref 10–20)
CALCIUM BLD-MCNC: 9.4 MG/DL (ref 8.5–10.1)
CHLORIDE SERPL-SCNC: 108 MMOL/L (ref 98–112)
CHOLEST SERPL-MCNC: 137 MG/DL (ref ?–200)
CO2 SERPL-SCNC: 28 MMOL/L (ref 21–32)
CREAT BLD-MCNC: 0.55 MG/DL
DEPRECATED RDW RBC AUTO: 43.8 FL (ref 35.1–46.3)
EOSINOPHIL # BLD AUTO: 0.11 X10(3) UL (ref 0–0.7)
EOSINOPHIL NFR BLD AUTO: 2 %
ERYTHROCYTE [DISTWIDTH] IN BLOOD BY AUTOMATED COUNT: 12.8 % (ref 11–15)
EST. AVERAGE GLUCOSE BLD GHB EST-MCNC: 105 MG/DL (ref 68–126)
FASTING PATIENT LIPID ANSWER: YES
FASTING STATUS PATIENT QL REPORTED: YES
GFR SERPLBLD BASED ON 1.73 SQ M-ARVRAT: 124 ML/MIN/1.73M2 (ref 60–?)
GLOBULIN PLAS-MCNC: 3.1 G/DL (ref 2.8–4.4)
GLUCOSE BLD-MCNC: 87 MG/DL (ref 70–99)
HBA1C MFR BLD: 5.3 % (ref ?–5.7)
HCT VFR BLD AUTO: 42.4 %
HDLC SERPL-MCNC: 62 MG/DL (ref 40–59)
HGB BLD-MCNC: 14.3 G/DL
IMM GRANULOCYTES # BLD AUTO: 0.01 X10(3) UL (ref 0–1)
IMM GRANULOCYTES NFR BLD: 0.2 %
IRON SATN MFR SERPL: 58 %
IRON SERPL-MCNC: 180 UG/DL
LDLC SERPL CALC-MCNC: 63 MG/DL (ref ?–100)
LYMPHOCYTES # BLD AUTO: 1.56 X10(3) UL (ref 1–4)
LYMPHOCYTES NFR BLD AUTO: 28.7 %
MCH RBC QN AUTO: 31.4 PG (ref 26–34)
MCHC RBC AUTO-ENTMCNC: 33.7 G/DL (ref 31–37)
MCV RBC AUTO: 93.2 FL
MONOCYTES # BLD AUTO: 0.47 X10(3) UL (ref 0.1–1)
MONOCYTES NFR BLD AUTO: 8.7 %
NEUTROPHILS # BLD AUTO: 3.18 X10 (3) UL (ref 1.5–7.7)
NEUTROPHILS # BLD AUTO: 3.18 X10(3) UL (ref 1.5–7.7)
NEUTROPHILS NFR BLD AUTO: 58.6 %
NONHDLC SERPL-MCNC: 75 MG/DL (ref ?–130)
OSMOLALITY SERPL CALC.SUM OF ELEC: 288 MOSM/KG (ref 275–295)
PLATELET # BLD AUTO: 319 10(3)UL (ref 150–450)
POTASSIUM SERPL-SCNC: 4 MMOL/L (ref 3.5–5.1)
PROT SERPL-MCNC: 6.9 G/DL (ref 6.4–8.2)
RBC # BLD AUTO: 4.55 X10(6)UL
SODIUM SERPL-SCNC: 140 MMOL/L (ref 136–145)
TIBC SERPL-MCNC: 308 UG/DL (ref 240–450)
TRANSFERRIN SERPL-MCNC: 207 MG/DL (ref 200–360)
TRIGL SERPL-MCNC: 54 MG/DL (ref 30–149)
TSI SER-ACNC: 0.98 MIU/ML (ref 0.36–3.74)
VIT D+METAB SERPL-MCNC: 29.3 NG/ML (ref 30–100)
VLDLC SERPL CALC-MCNC: 8 MG/DL (ref 0–30)
WBC # BLD AUTO: 5.4 X10(3) UL (ref 4–11)

## 2023-03-31 PROCEDURE — 3078F DIAST BP <80 MM HG: CPT | Performed by: FAMILY MEDICINE

## 2023-03-31 PROCEDURE — 84466 ASSAY OF TRANSFERRIN: CPT

## 2023-03-31 PROCEDURE — 83036 HEMOGLOBIN GLYCOSYLATED A1C: CPT

## 2023-03-31 PROCEDURE — 85025 COMPLETE CBC W/AUTO DIFF WBC: CPT

## 2023-03-31 PROCEDURE — 80061 LIPID PANEL: CPT

## 2023-03-31 PROCEDURE — 36415 COLL VENOUS BLD VENIPUNCTURE: CPT

## 2023-03-31 PROCEDURE — 3074F SYST BP LT 130 MM HG: CPT | Performed by: FAMILY MEDICINE

## 2023-03-31 PROCEDURE — 99395 PREV VISIT EST AGE 18-39: CPT | Performed by: FAMILY MEDICINE

## 2023-03-31 PROCEDURE — 84443 ASSAY THYROID STIM HORMONE: CPT

## 2023-03-31 PROCEDURE — 82306 VITAMIN D 25 HYDROXY: CPT

## 2023-03-31 PROCEDURE — 80053 COMPREHEN METABOLIC PANEL: CPT

## 2023-03-31 PROCEDURE — 83540 ASSAY OF IRON: CPT

## 2023-05-30 ENCOUNTER — APPOINTMENT (OUTPATIENT)
Dept: GENERAL RADIOLOGY | Age: 33
End: 2023-05-30
Attending: NURSE PRACTITIONER
Payer: COMMERCIAL

## 2023-05-30 ENCOUNTER — HOSPITAL ENCOUNTER (OUTPATIENT)
Age: 33
Discharge: HOME | End: 2023-05-30
Payer: COMMERCIAL

## 2023-05-30 VITALS
OXYGEN SATURATION: 100 % | BODY MASS INDEX: 23.03 KG/M2 | DIASTOLIC BLOOD PRESSURE: 75 MMHG | HEART RATE: 65 BPM | TEMPERATURE: 98 F | HEIGHT: 61 IN | RESPIRATION RATE: 20 BRPM | WEIGHT: 122 LBS | SYSTOLIC BLOOD PRESSURE: 115 MMHG

## 2023-05-30 DIAGNOSIS — S92.511A CLOSED DISPLACED FRACTURE OF PROXIMAL PHALANX OF LESSER TOE OF RIGHT FOOT, INITIAL ENCOUNTER: Primary | ICD-10-CM

## 2023-05-30 PROCEDURE — 99214 OFFICE O/P EST MOD 30 MIN: CPT

## 2023-05-30 PROCEDURE — 99213 OFFICE O/P EST LOW 20 MIN: CPT

## 2023-05-30 PROCEDURE — 73660 X-RAY EXAM OF TOE(S): CPT | Performed by: NURSE PRACTITIONER

## 2023-05-30 NOTE — ED INITIAL ASSESSMENT (HPI)
3 weeks ago patient banged her right toes on the foot of a bed. States initially her right 4th toe appeared out of alignment. She has been taping her toes together for support.

## 2023-05-30 NOTE — DISCHARGE INSTRUCTIONS
You do have a toe fracture that will heal with time.  Follow up with podiatry if persistent symptoms

## 2024-01-02 ENCOUNTER — TELEPHONE (OUTPATIENT)
Dept: OBGYN CLINIC | Facility: CLINIC | Age: 34
End: 2024-01-02

## 2024-01-02 ENCOUNTER — LAB ENCOUNTER (OUTPATIENT)
Dept: LAB | Age: 34
End: 2024-01-02
Attending: OBSTETRICS & GYNECOLOGY
Payer: COMMERCIAL

## 2024-01-02 DIAGNOSIS — R30.0 BURNING WITH URINATION: Primary | ICD-10-CM

## 2024-01-02 DIAGNOSIS — R30.0 BURNING WITH URINATION: ICD-10-CM

## 2024-01-02 LAB
BILIRUB UR QL STRIP.AUTO: NEGATIVE
CLARITY UR REFRACT.AUTO: CLEAR
COLOR UR AUTO: COLORLESS
GLUCOSE UR STRIP.AUTO-MCNC: NORMAL MG/DL
KETONES UR STRIP.AUTO-MCNC: NEGATIVE MG/DL
LEUKOCYTE ESTERASE UR QL STRIP.AUTO: NEGATIVE
NITRITE UR QL STRIP.AUTO: NEGATIVE
PH UR STRIP.AUTO: 7 [PH] (ref 5–8)
PROT UR STRIP.AUTO-MCNC: NEGATIVE MG/DL
RBC UR QL AUTO: NEGATIVE
SP GR UR STRIP.AUTO: <1.005 (ref 1–1.03)
UROBILINOGEN UR STRIP.AUTO-MCNC: NORMAL MG/DL

## 2024-01-02 PROCEDURE — 81003 URINALYSIS AUTO W/O SCOPE: CPT

## 2024-01-02 NOTE — TELEPHONE ENCOUNTER
Pt states she initially thought she was experiencing a yeast infection so she finished a Monistat 7 treatment on Sunday. However, since Sunday she has been experiencing \"burning after urination.\" Pt denies vaginal symptoms such as discharge, vaginal itching or irritation. Pt denies cloudy or odorous urine, increased urge to urinate, body aches, chills or fever.     Informed pt we will order UA to rule out a UTI.    In the meantime, provided recs:   Emptying bladder frequently.   No anal to vaginal intercourse.   Urinating directly after intercourse.   Wearing cotton underwear and keeping area dry.  Avoiding scented soaps.   Staying hydrated by drinking at least 64 oz/day.   Can try OTC supplements such as AZO or UTI.     Advised pt order for UA has been placed and we will await results. Pt verbalized understanding.

## 2024-01-03 NOTE — TELEPHONE ENCOUNTER
Informed pt of negative UA result. Pt states she feels she is \"wet\" after she is done voiding and wiping. States it is not vaginal discharge. Also does not think she is having involuntary leaking of urine. States she was having urinary frequency yesterday for about one hour she voided 4 times. States she drinks a lot of coffee and denies any other changes to diet. Advised pt that caffeine works like a diuretic. Advised to cut back on caffeine intake and monitor if this improves the issue. Informed pt if she is having moisture to area than that can cause irritation to the skin. Advised to keep clean and dry and avoid soaps and products to area for a few days until it feels better. Informed to call back if no improvement because she will need to come in for exam. Pt agrees.

## 2024-01-04 ENCOUNTER — OFFICE VISIT (OUTPATIENT)
Dept: OBGYN CLINIC | Facility: CLINIC | Age: 34
End: 2024-01-04
Payer: COMMERCIAL

## 2024-01-04 VITALS
BODY MASS INDEX: 24 KG/M2 | DIASTOLIC BLOOD PRESSURE: 78 MMHG | HEART RATE: 75 BPM | WEIGHT: 125.63 LBS | SYSTOLIC BLOOD PRESSURE: 118 MMHG

## 2024-01-04 DIAGNOSIS — R35.0 URINARY FREQUENCY: Primary | ICD-10-CM

## 2024-01-04 DIAGNOSIS — N89.8 VAGINAL ITCHING: ICD-10-CM

## 2024-01-04 PROCEDURE — 3078F DIAST BP <80 MM HG: CPT | Performed by: NURSE PRACTITIONER

## 2024-01-04 PROCEDURE — 3074F SYST BP LT 130 MM HG: CPT | Performed by: NURSE PRACTITIONER

## 2024-01-04 PROCEDURE — 99213 OFFICE O/P EST LOW 20 MIN: CPT | Performed by: NURSE PRACTITIONER

## 2024-01-04 NOTE — PROGRESS NOTES
[facility]   Obstetrics and Gynecology    Mini Henning is a 33 year old female  No LMP recorded (lmp unknown). (Menstrual status: IUD - Intrauterine Device).   Chief Complaint   Patient presents with    Gyn Problem     UTI symptoms // Burning after urinating // Vaginal irritation // Lower abdomen pain per pt      . She started having symptoms on  that she thought was a yeast infection and itching so she did a monistat for 7 days. Reports it did help symptoms. However now noticing some pain with holding her urine, and urinary frequency but little comes out. No burning with urination but burning after urination but resolved.    After shower today without soap had some burning.    She admits to increased coffee and diet soda intake over holidays, decreased water intake. Advised to elimate or decrease caffeine/soda to one a day and increased water intake.    Sexually active, same partner. No pain or bleeding with sex.    Pap:2021 NILM, neg HPV  Contraception: mirena IUD - on and off cycles.    OBSTETRICS HISTORY:  OB History    Para Term  AB Living   3 1 1 0 2 1   SAB IAB Ectopic Multiple Live Births   2 0 0 0 1       GYNE HISTORY:  Menarche: 11 y/o (2024  2:02 PM)  Period Cycle (Days): SPOTTING ONLY WITH IUD  (2024  2:02 PM)  Use of Birth Control (if yes, specify type): Mirena IUD (3/18/19) (2024  2:02 PM)  Hx Prior Abnormal Pap: No (2024  2:02 PM)  Pap Date: 21 (2024  2:02 PM)  Pap Result Notes: Neg Pap/HPV // Annual 23 EMB (2024  2:02 PM)      History   Sexual Activity    Sexual activity: Yes    Partners: Male       MEDICAL HISTORY:  Past Medical History:   Diagnosis Date    Anxiety     SAB (spontaneous ) 2017, 3 2017    Varicella        SOCIAL HISTORY:  Social History     Socioeconomic History    Marital status:      Spouse name: Not on file    Number of children: Not on file    Years of education: Not on file    Highest  education level: Not on file   Occupational History    Not on file   Tobacco Use    Smoking status: Never    Smokeless tobacco: Never   Vaping Use    Vaping Use: Never used   Substance and Sexual Activity    Alcohol use: Yes     Alcohol/week: 0.0 standard drinks of alcohol    Drug use: No    Sexual activity: Yes     Partners: Male   Other Topics Concern    Not on file   Social History Narrative    Not on file     Social Determinants of Health     Financial Resource Strain: Not on file   Food Insecurity: Not on file   Transportation Needs: Not on file   Physical Activity: Not on file   Stress: Not on file   Social Connections: Not on file   Housing Stability: Not on file       MEDICATIONS:    Current Outpatient Medications:     Levonorgestrel 20 MCG/24HR Intrauterine IUD, 1 each by Intrauterine route once.  , Disp: , Rfl:     ALLERGIES:  No Known Allergies      Review of Systems:  Constitutional:  Denies fatigue, night sweats, hot flashes  Cardiovascular:  denies chest pain or palpitations  Respiratory:  denies shortness of breath  Gastrointestinal:  denies heartburn, abdominal pain, diarrhea or constipation  Genitourinary:  denies dysuria, incontinence, abnormal vaginal discharge, vaginal itching   Musculoskeletal:  denies back pain.  Skin/Breast:  Denies any breast pain, lumps, or discharge.   Neurological:  denies headaches, extremity weakness or numbness.  Psychiatric: denies depression or anxiety.  Endocrine:   denies excessive thirst or urination.  Heme/Lymph:  denies history of anemia, easy bruising or bleeding.      PHYSICAL EXAM:     Vitals:    01/04/24 1422   BP: 118/78   Pulse: 75   Weight: 125 lb 9.6 oz (57 kg)     Body mass index is 23.73 kg/m².     Constitutional: well developed, well nourished    Psychiatric:  Oriented to time, place, person and situation. Appropriate mood and affect    Pelvic Exam:  External Genitalia: normal appearance, hair distribution, and no lesions  Urethral Meatus:  normal in  size, location, without lesions and prolapse  Bladder:  No fullness, masses or tenderness  Vagina:  Normal appearance without lesions, no abnormal discharge  Cervix:  +IUD strings, Normal without tenderness on motion  Uterus: normal in size, contour, position, mobility, without tenderness  Adnexa: normal without masses or tenderness  Perineum: normal  Anus: no hemorroids   Lymph node: no inguinal lymph nodes    Assessment & Plan:  Mini was seen today for gyn problem.    Diagnoses and all orders for this visit:    Urinary frequency    Vaginal itching    UA normal on 1/2/2024  Vaginal cultures done  Increase fluids, decrease caffeine  If continues follow up with urology      Laila Pink, ASHWINI    This note was prepared using Dragon Medical voice recognition dictation software. As a result errors may occur. When identified these errors have been corrected. While every attempt is made to correct errors during dictation discrepancies may still exist.

## 2024-01-05 ENCOUNTER — TELEPHONE (OUTPATIENT)
Dept: OBGYN CLINIC | Facility: CLINIC | Age: 34
End: 2024-01-05

## 2024-01-05 LAB
BV BACTERIA DNA VAG QL NAA+PROBE: POSITIVE
C GLABRATA DNA VAG QL NAA+PROBE: NEGATIVE
C KRUSEI DNA VAG QL NAA+PROBE: NEGATIVE
CANDIDA DNA VAG QL NAA+PROBE: NEGATIVE
T VAGINALIS DNA VAG QL NAA+PROBE: NEGATIVE

## 2024-01-05 RX ORDER — METRONIDAZOLE 500 MG/1
500 TABLET ORAL 2 TIMES DAILY
Qty: 14 TABLET | Refills: 0 | Status: SHIPPED | OUTPATIENT
Start: 2024-01-05 | End: 2024-01-12

## 2024-01-08 ENCOUNTER — TELEPHONE (OUTPATIENT)
Dept: OBGYN CLINIC | Facility: CLINIC | Age: 34
End: 2024-01-08

## 2024-01-08 NOTE — TELEPHONE ENCOUNTER
Informed pt it is not recommended to go swimming while taking antibiotics for BV. Pt verbalized understanding.

## 2024-01-08 NOTE — TELEPHONE ENCOUNTER
Pt is asking if she can go swimming with the  infection she has  Okay to lvm if she can or can't ,

## 2024-03-01 ENCOUNTER — OFFICE VISIT (OUTPATIENT)
Dept: OBGYN CLINIC | Facility: CLINIC | Age: 34
End: 2024-03-01

## 2024-03-01 VITALS
BODY MASS INDEX: 23 KG/M2 | HEART RATE: 55 BPM | WEIGHT: 124 LBS | DIASTOLIC BLOOD PRESSURE: 75 MMHG | SYSTOLIC BLOOD PRESSURE: 113 MMHG

## 2024-03-01 DIAGNOSIS — Z97.5 PRESENCE OF IUD: ICD-10-CM

## 2024-03-01 DIAGNOSIS — Z01.419 WELL WOMAN EXAM WITH ROUTINE GYNECOLOGICAL EXAM: Primary | ICD-10-CM

## 2024-03-01 DIAGNOSIS — Z12.4 SCREENING FOR CERVICAL CANCER: ICD-10-CM

## 2024-03-01 PROCEDURE — 99395 PREV VISIT EST AGE 18-39: CPT | Performed by: NURSE PRACTITIONER

## 2024-03-01 PROCEDURE — 3074F SYST BP LT 130 MM HG: CPT | Performed by: NURSE PRACTITIONER

## 2024-03-01 PROCEDURE — 3078F DIAST BP <80 MM HG: CPT | Performed by: NURSE PRACTITIONER

## 2024-03-01 NOTE — PROGRESS NOTES
[facility]    Obstetrics and Gynecology    Chief Complaint   Patient presents with    Gyn Exam       Mini Henning is a 33 year old female  Patient's last menstrual period was 2024 (approximate). presenting for annual gynecology exam.  Sexually active, same partner. No pain or bleeding with sex.  Considering future pregnancy but uncertain.  No urinary bowel or bladder concerns.  No mood concerns.    Increasing exercise.     Pap:2021 NILM, neg HPV  Contraception: mirena IUD - on and off cycles - placed ; due removal in     Mammo: n/a  Colonoscopy: n/a    OBSTETRICS HISTORY:  OB History    Para Term  AB Living   3 1 1 0 2 1   SAB IAB Ectopic Multiple Live Births   2 0 0 0 1       GYNE HISTORY:  Menarche: 9 y/o (3/1/2024 11:20 AM)  Period Cycle (Days): spotting occasionally with IUD (3/1/2024 11:20 AM)  Use of Birth Control (if yes, specify type): Mirena IUD (3/18/19) (3/1/2024 11:20 AM)  Hx Prior Abnormal Pap: No (3/1/2024 11:20 AM)  Pap Date: 21 (3/1/2024 11:20 AM)  Pap Result Notes: Neg Pap, HPV neg //// (3/1/2024 11:20 AM)      History   Sexual Activity    Sexual activity: Yes    Partners: Male    Birth control/ protection: I.U.D., Mirena           Latest Ref Rng & Units 2021    11:40 AM 2018     2:55 PM   RECENT PAP RESULTS   Thinprep Pap Negative for intraepithelial lesion or malignancy Negative for intraepithelial lesion or malignancy  Negative for intraepithelial lesion or malignancy    HPV Negative Negative           MEDICAL HISTORY:  Past Medical History:   Diagnosis Date    Anxiety     SAB (spontaneous ) ([facility]) 2017, 3 2017    Varicella      No past surgical history on file.    SOCIAL HISTORY:  Social History     Socioeconomic History    Marital status:      Spouse name: Not on file    Number of children: Not on file    Years of education: Not on file    Highest education level: Not on file   Occupational History    Not on  file   Tobacco Use    Smoking status: Never    Smokeless tobacco: Never   Vaping Use    Vaping Use: Never used   Substance and Sexual Activity    Alcohol use: Yes     Comment: social    Drug use: No    Sexual activity: Yes     Partners: Male     Birth control/protection: I.U.D., Mirena   Other Topics Concern    Not on file   Social History Narrative    Not on file     Social Determinants of Health     Financial Resource Strain: Not on file   Food Insecurity: Not on file   Transportation Needs: Not on file   Physical Activity: Not on file   Stress: Not on file   Social Connections: Not on file   Housing Stability: Not on file         Depression Screening (PHQ-2/PHQ-9): Over the LAST 2 WEEKS   Little interest or pleasure in doing things (over the last two weeks)?: Not at all    Feeling down, depressed, or hopeless (over the last two weeks)?: Not at all    PHQ-2 SCORE: 0           FAMILY HISTORY:  Family History   Problem Relation Age of Onset    Diabetes Paternal Grandfather     Bipolar Disorder Brother     Schizophrenia Brother     Bipolar Disorder Sister     Anxiety Sister        MEDICATIONS:    Current Outpatient Medications:     Levonorgestrel 20 MCG/24HR Intrauterine IUD, 20 mcg (1 each total) by Intrauterine route once., Disp: , Rfl:     ALLERGIES:  No Known Allergies      Review of Systems:  Constitutional:  Denies fatigue, night sweats, hot flashes  Eyes:  denies blurred or double vision  Cardiovascular:  denies chest pain or palpitations  Respiratory:  denies shortness of breath  Gastrointestinal:  denies heartburn, abdominal pain, diarrhea or constipation  Genitourinary:  denies dysuria, incontinence, abnormal vaginal discharge, vaginal itching,   Musculoskeletal:  denies back pain   Skin/Breast:  Denies any breast pain, lumps, or discharge.   Neurological:  denies headaches, extremity weakness or numbness.  Psychiatric: denies depression or anxiety.  Endocrine:   denies excessive thirst or  urination.  Heme/Lymph:  denies history of anemia, easy bruising or bleeding.      PHYSICAL EXAM:     Vitals:    03/01/24 1122   BP: 113/75   Pulse: 55   Weight: 124 lb (56.2 kg)       Body mass index is 23.43 kg/m².     Constitutional: well developed, well nourished  Psychiatric:  Oriented to time, place, person and situation. Appropriate mood and affect  Head/Face: normocephalic  Neck/Thyroid: thyroid symmetric, no thyromegaly, no nodules, no adenopathy  Lymphatic:no abnormal supraclavicular or axillary adenopathy is noted  Breast: normal without palpable masses, tenderness, asymmetry, nipple discharge, nipple retraction or skin changes  Abdomen:  soft, nontender, nondistended, no masses  Skin/Hair: no unusual rashes or bruises  Extremities: no edema, no cyanosis    Pelvic Exam:  External Genitalia: normal appearance, hair distribution, and no lesions  Urethral Meatus:  normal in size, location, without lesions and prolapse  Bladder:  No fullness, masses or tenderness  Vagina:  Normal appearance without lesions, no abnormal discharge  Cervix: +IUD strings,  Normal without tenderness on motion  Uterus: normal in size, contour, position, mobility, without tenderness  Adnexa: normal without masses or tenderness  Perineum: normal  Anus: no hemorroids     Assessment & Plan:    ICD-10-CM    1. Well woman exam with routine gynecological exam  Z01.419       2. Screening for cervical cancer  Z12.4 ThinPrep PAP Smear     Hpv Dna  High Risk , Thin Prep Collect     ThinPrep PAP Smear     Hpv Dna  High Risk , Thin Prep Collect      3. Presence of IUD  Z97.5          Reviewed ASCCP guidelines with the patient   Pap with HPV  Contraception: Using mirena IUD, appears in situ. Rev'd fda approved for 8 years for contraception. Due removal in 2027  Breast Health:     Reviewed current guidelines with the patient and to start Mammograms at age 40  Reviewed monthly self breast exams with the patient   Discussed diet, exercise, MVIs  with Ca/Vit D  Follow up in 1 yr for ASHWINI Diaz    This note was prepared using Dragon Medical voice recognition dictation software. As a result errors may occur. When identified these errors have been corrected. While every attempt is made to correct errors during dictation discrepancies may still exist.

## 2024-03-04 LAB — HPV I/H RISK 1 DNA SPEC QL NAA+PROBE: NEGATIVE

## 2024-04-02 NOTE — TELEPHONE ENCOUNTER
Patient was calling the office back because she said she does not remember her results and also because she is still feeling uncomfortable with pain    We usually only excuse from [place] duty those patients that are near term of immediately postpartum and she is only 21 weeks. I would say no, but please put on OB juani for discussion on Friday to get all the MD opinions.

## 2024-05-02 ENCOUNTER — OFFICE VISIT (OUTPATIENT)
Dept: FAMILY MEDICINE CLINIC | Facility: CLINIC | Age: 34
End: 2024-05-02
Payer: COMMERCIAL

## 2024-05-02 VITALS
HEART RATE: 74 BPM | OXYGEN SATURATION: 99 % | DIASTOLIC BLOOD PRESSURE: 86 MMHG | TEMPERATURE: 98 F | SYSTOLIC BLOOD PRESSURE: 125 MMHG | BODY MASS INDEX: 23.22 KG/M2 | RESPIRATION RATE: 16 BRPM | HEIGHT: 61 IN | WEIGHT: 123 LBS

## 2024-05-02 DIAGNOSIS — J02.9 SORE THROAT: ICD-10-CM

## 2024-05-02 DIAGNOSIS — J02.0 STREP THROAT: Primary | ICD-10-CM

## 2024-05-02 LAB
CONTROL LINE PRESENT WITH A CLEAR BACKGROUND (YES/NO): YES YES/NO
KIT LOT #: NORMAL NUMERIC

## 2024-05-02 PROCEDURE — 3079F DIAST BP 80-89 MM HG: CPT | Performed by: NURSE PRACTITIONER

## 2024-05-02 PROCEDURE — 99213 OFFICE O/P EST LOW 20 MIN: CPT | Performed by: NURSE PRACTITIONER

## 2024-05-02 PROCEDURE — 87880 STREP A ASSAY W/OPTIC: CPT | Performed by: NURSE PRACTITIONER

## 2024-05-02 PROCEDURE — 3008F BODY MASS INDEX DOCD: CPT | Performed by: NURSE PRACTITIONER

## 2024-05-02 PROCEDURE — 3074F SYST BP LT 130 MM HG: CPT | Performed by: NURSE PRACTITIONER

## 2024-05-02 RX ORDER — AMOXICILLIN 500 MG/1
500 CAPSULE ORAL 2 TIMES DAILY
Qty: 20 CAPSULE | Refills: 0 | Status: SHIPPED | OUTPATIENT
Start: 2024-05-02 | End: 2024-05-12

## 2024-05-02 NOTE — PROGRESS NOTES
Subjective:   Patient ID: Mini Henning is a 33 year old female.    Patient is a 33 year old female who presents today with her son for complaints of sore throat and fatigue x 0230 this morning. Denies fevers, body aches, chills, runny nose, nasal congestion, cough, ear pain, headaches, rashes, n/v/d or abdominal pain. Tolerating PO well at home. Attempted treatment prior to arrival  =Tylenol cold/flu without much relief. No ill contacts she can identify. Recently home from [place].        History/Other:   Review of Systems   Constitutional:  Positive for fatigue. Negative for appetite change, chills and fever.   HENT:  Positive for sore throat. Negative for congestion, ear pain and rhinorrhea.    Respiratory:  Negative for cough.    Gastrointestinal:  Negative for abdominal pain, diarrhea, nausea and vomiting.   Musculoskeletal:  Negative for myalgias.   Skin:  Negative for rash.   Neurological:  Negative for headaches.     Current Outpatient Medications   Medication Sig Dispense Refill    amoxicillin 500 MG Oral Cap Take 1 capsule (500 mg total) by mouth 2 (two) times daily for 10 days. 20 capsule 0    Levonorgestrel 20 MCG/24HR Intrauterine IUD 20 mcg (1 each total) by Intrauterine route once.       Allergies:No Known Allergies    /86   Pulse 74   Temp 97.7 °F (36.5 °C)   Resp 16   Ht 5' 1\" (1.549 m)   Wt 123 lb (55.8 kg)   LMP 04/20/2024 (Approximate)   SpO2 99%   BMI 23.24 kg/m²       Objective:   Physical Exam  Vitals reviewed.   Constitutional:       General: She is awake. She is not in acute distress.     Appearance: Normal appearance. She is well-developed and well-groomed. She is not ill-appearing, toxic-appearing or diaphoretic.   HENT:      Head: Normocephalic and atraumatic.      Right Ear: Tympanic membrane, ear canal and external ear normal.      Left Ear: Tympanic membrane, ear canal and external ear normal.      Nose: Nose normal.      Mouth/Throat:      Lips: Pink.       Mouth: Mucous membranes are moist. No oral lesions.      Pharynx: Oropharynx is clear. Uvula midline. Posterior oropharyngeal erythema present. No pharyngeal swelling, oropharyngeal exudate or uvula swelling.      Tonsils: Tonsillar exudate present. 1+ on the right. 1+ on the left.   Cardiovascular:      Rate and Rhythm: Normal rate and regular rhythm.   Pulmonary:      Effort: Pulmonary effort is normal. No respiratory distress.      Breath sounds: Normal breath sounds and air entry. No decreased breath sounds, wheezing, rhonchi or rales.   Lymphadenopathy:      Cervical: No cervical adenopathy.   Skin:     General: Skin is warm and dry.   Neurological:      Mental Status: She is alert and oriented to person, place, and time.   Psychiatric:         Behavior: Behavior is cooperative.         Assessment & Plan:   1. Strep throat    2. Sore throat        Orders Placed This Encounter   Procedures    Rapid Strep     Results for orders placed or performed in visit on 05/02/24   Rapid Strep    Collection Time: 05/02/24  1:08 PM   Result Value Ref Range    Strep Grp A Screen pos Negative    Control Line Present with a clear background (yes/no) yes Yes/No    Kit Lot # [id number] Numeric    Kit Expiration Date 5/21/25 Date       Meds This Visit:  Requested Prescriptions     Signed Prescriptions Disp Refills    amoxicillin 500 MG Oral Cap 20 capsule 0     Sig: Take 1 capsule (500 mg total) by mouth 2 (two) times daily for 10 days.     Reviewed POC test results with patient.  Reassuring physical exam findings. Vitals WNL. No sign of RDS or dehydration at this time.  START Amoxicillin today.  Supportive care and return to care measures reviewed.  Patient v/u and is comfortable with this plan.    Patient Instructions   1. Take Amoxicillin antibiotic as directed.    2. You are still contagious for 24 hours following the first dose of antibiotics.    3. Perform good hand hygiene often.  4. Change your toothbrush in 2-3 days.  5.  Follow up with primary care physician as needed, recommend a follow up within 2 weeks  6. You may use throat lozenges, acetaminophen, or ibuprofen for pain and fever.    7. Gargling with warm salt water may ease your pain for a few hours. You may also drink warmed or salty liquids such as broth, or tea (if of age. No honey under 12 months old).   8. Seek medical attention sooner for worsening of symptoms despite treatment efforts, or the emergency room for the following non inclusive list of symptoms: uncontrolled fever/pain, inability to keep fluids down, shortness of breath or respiratory distress

## 2024-06-18 ENCOUNTER — OFFICE VISIT (OUTPATIENT)
Dept: FAMILY MEDICINE CLINIC | Facility: CLINIC | Age: 34
End: 2024-06-18

## 2024-06-18 VITALS
WEIGHT: 125 LBS | HEART RATE: 78 BPM | BODY MASS INDEX: 23.6 KG/M2 | DIASTOLIC BLOOD PRESSURE: 62 MMHG | SYSTOLIC BLOOD PRESSURE: 112 MMHG | HEIGHT: 61 IN | OXYGEN SATURATION: 99 %

## 2024-06-18 DIAGNOSIS — R53.82 CHRONIC FATIGUE: ICD-10-CM

## 2024-06-18 DIAGNOSIS — E55.9 VITAMIN D DEFICIENCY: ICD-10-CM

## 2024-06-18 DIAGNOSIS — E53.8 B12 DEFICIENCY: ICD-10-CM

## 2024-06-18 DIAGNOSIS — Z00.00 ANNUAL PHYSICAL EXAM: Primary | ICD-10-CM

## 2024-06-18 PROCEDURE — 3078F DIAST BP <80 MM HG: CPT | Performed by: FAMILY MEDICINE

## 2024-06-18 PROCEDURE — 99385 PREV VISIT NEW AGE 18-39: CPT | Performed by: FAMILY MEDICINE

## 2024-06-18 PROCEDURE — 3074F SYST BP LT 130 MM HG: CPT | Performed by: FAMILY MEDICINE

## 2024-06-18 PROCEDURE — 3008F BODY MASS INDEX DOCD: CPT | Performed by: FAMILY MEDICINE

## 2024-06-18 NOTE — H&P
HPI:   Mini Henning is a 33 year old female who presents for a well woman exam. Symptoms: denies discharge, itching, burning or dysuria.    CHRONIC FATIGUE.    Patient's last menstrual period was 2024 (approximate).  Previous pap:   Health Maintenance   Topic Date Due    Pap Smear  2027      Performs SBEs:YES  Contraception: YES                        Current Outpatient Medications   Medication Sig Dispense Refill    cholecalciferol 125 MCG (5000 UT) Oral Tab Take 1 tablet (5,000 Units total) by mouth daily.      Levonorgestrel 20 MCG/24HR Intrauterine IUD 20 mcg (1 each total) by Intrauterine route once.        Past Medical History:    Anxiety    SAB (spontaneous ) ([facility])    Varicella      No past surgical history on file.   Family History   Problem Relation Age of Onset    Diabetes Paternal Grandfather     Bipolar Disorder Brother     Schizophrenia Brother     Asthma Brother     Depression Brother     Diabetes Father     Bipolar Disorder Sister     Anxiety Sister     Depression Sister       Social History:   Social History     Socioeconomic History    Marital status:    Tobacco Use    Smoking status: Never    Smokeless tobacco: Never   Vaping Use    Vaping status: Never Used   Substance and Sexual Activity    Alcohol use: Not Currently     Comment: social    Drug use: No    Sexual activity: Yes     Partners: Male     Birth control/protection: I.U.D., Mirena   Other Topics Concern    Caffeine Concern Yes     Comment: Drink alot and still tired    Exercise No    Seat Belt Yes    Special Diet No    Stress Concern No    Weight Concern No     Exercise:  twice per week.  Diet: watches fats closely     REVIEW OF SYSTEMS:   GENERAL: feels well otherwise  SKIN: denies unusual skin lesions  HEENT:no vision or hearing changes; denies nasal congestion, sinus pain or sore throat  LUNGS: denies shortness of breath, chest heaviness or cough  CV: denies chest pain, pressure or palpitations  GI:  denies abdominal pain; denies heartburn, frequent diarrhea or constipation  : denies dysuria, vaginal discharge or itching; denies pelvic pain  MS: denies back pain  NEURO: denies headaches; no dizziness  PSYCH: denies depression or anxiety  HEME: denies hx of anemia  ENDOCRINE: denies thyroid history, denies excessive thirst, denies significant weight change  ALL/ASTHMA: denies hx of  allergy or asthma    EXAM:   /62   Pulse 78   Ht 5' 1\" (1.549 m)   Wt 125 lb (56.7 kg)   LMP 04/20/2024 (Approximate)   SpO2 99%   BMI 23.62 kg/m²       Body mass index is 23.62 kg/m².      GENERAL: pleasant and in no acute distress  SKIN: warm and dry  HEENT: atraumatic, normocephalic; PERRLA, conjunctiva clear; ears, nose and throat are clear  NECK: supple,no adenopathy,NO thyromegaly  BREASTS: Deferred.  Pt sees gynecologist.     LUNGS: clear to auscultation, easy breathing  CV: normal S1S2, RRR without murmur  GI: BSs present, no tenderness or organomegaly  :Deferred.  Pt sees gynecologist.     MS: Myke, no bony deformities  EXT: no cyanosis, clubbing or edema  NEURO: A&Ox3, motor and sensory are grossly intact, good coordination; no tremors    ASSESSMENT AND PLAN:   1. Annual physical exam  - Lipid Panel; Future  - CBC With Differential With Platelet; Future  - Comp Metabolic Panel (14); Future  - TSH W Reflex To Free T4; Future  - Vitamin D; Future  - HIV AG AB Combo [E]; Future    2. Vitamin D deficiency  - Vitamin D; Future    3. B12 deficiency  - Vitamin B12 [E]; Future    4. Chronic fatigue  - HIV AG AB Combo [E]; Future    Mini was given an opportunity to ask questions and verbalized understanding of care. Follow up 1 YEAR

## 2024-06-19 ENCOUNTER — LAB ENCOUNTER (OUTPATIENT)
Dept: LAB | Age: 34
End: 2024-06-19
Attending: FAMILY MEDICINE

## 2024-06-19 DIAGNOSIS — E53.8 B12 DEFICIENCY: ICD-10-CM

## 2024-06-19 DIAGNOSIS — Z00.00 ANNUAL PHYSICAL EXAM: ICD-10-CM

## 2024-06-19 DIAGNOSIS — E55.9 VITAMIN D DEFICIENCY: ICD-10-CM

## 2024-06-19 DIAGNOSIS — R53.82 CHRONIC FATIGUE: ICD-10-CM

## 2024-06-19 LAB
ALBUMIN SERPL-MCNC: 3.8 G/DL (ref 3.4–5)
ALBUMIN/GLOB SERPL: 1.3 {RATIO} (ref 1–2)
ALP LIVER SERPL-CCNC: 39 U/L
ALT SERPL-CCNC: 15 U/L
ANION GAP SERPL CALC-SCNC: 5 MMOL/L (ref 0–18)
AST SERPL-CCNC: 13 U/L (ref 15–37)
BASOPHILS # BLD AUTO: 0.1 X10(3) UL (ref 0–0.2)
BASOPHILS NFR BLD AUTO: 2 %
BILIRUB SERPL-MCNC: 0.5 MG/DL (ref 0.1–2)
BUN BLD-MCNC: 10 MG/DL (ref 9–23)
CALCIUM BLD-MCNC: 9.2 MG/DL (ref 8.5–10.1)
CHLORIDE SERPL-SCNC: 108 MMOL/L (ref 98–112)
CHOLEST SERPL-MCNC: 149 MG/DL (ref ?–200)
CO2 SERPL-SCNC: 25 MMOL/L (ref 21–32)
CREAT BLD-MCNC: 0.66 MG/DL
EGFRCR SERPLBLD CKD-EPI 2021: 118 ML/MIN/1.73M2 (ref 60–?)
EOSINOPHIL # BLD AUTO: 0.18 X10(3) UL (ref 0–0.7)
EOSINOPHIL NFR BLD AUTO: 3.6 %
ERYTHROCYTE [DISTWIDTH] IN BLOOD BY AUTOMATED COUNT: 13 %
FASTING PATIENT LIPID ANSWER: YES
FASTING STATUS PATIENT QL REPORTED: YES
GLOBULIN PLAS-MCNC: 2.9 G/DL (ref 2.8–4.4)
GLUCOSE BLD-MCNC: 94 MG/DL (ref 70–99)
HCT VFR BLD AUTO: 42.5 %
HDLC SERPL-MCNC: 61 MG/DL (ref 40–59)
HGB BLD-MCNC: 14.5 G/DL
IMM GRANULOCYTES # BLD AUTO: 0.01 X10(3) UL (ref 0–1)
IMM GRANULOCYTES NFR BLD: 0.2 %
LDLC SERPL CALC-MCNC: 77 MG/DL (ref ?–100)
LYMPHOCYTES # BLD AUTO: 1.27 X10(3) UL (ref 1–4)
LYMPHOCYTES NFR BLD AUTO: 25.2 %
MCH RBC QN AUTO: 31.9 PG (ref 26–34)
MCHC RBC AUTO-ENTMCNC: 34.1 G/DL (ref 31–37)
MCV RBC AUTO: 93.4 FL
MONOCYTES # BLD AUTO: 0.42 X10(3) UL (ref 0.1–1)
MONOCYTES NFR BLD AUTO: 8.3 %
NEUTROPHILS # BLD AUTO: 3.06 X10 (3) UL (ref 1.5–7.7)
NEUTROPHILS # BLD AUTO: 3.06 X10(3) UL (ref 1.5–7.7)
NEUTROPHILS NFR BLD AUTO: 60.7 %
NONHDLC SERPL-MCNC: 88 MG/DL (ref ?–130)
OSMOLALITY SERPL CALC.SUM OF ELEC: 285 MOSM/KG (ref 275–295)
PLATELET # BLD AUTO: 299 10(3)UL (ref 150–450)
POTASSIUM SERPL-SCNC: 3.9 MMOL/L (ref 3.5–5.1)
PROT SERPL-MCNC: 6.7 G/DL (ref 6.4–8.2)
RBC # BLD AUTO: 4.55 X10(6)UL
SODIUM SERPL-SCNC: 138 MMOL/L (ref 136–145)
TRIGL SERPL-MCNC: 53 MG/DL (ref 30–149)
TSI SER-ACNC: 0.89 MIU/ML (ref 0.36–3.74)
VIT B12 SERPL-MCNC: 413 PG/ML (ref 193–986)
VIT D+METAB SERPL-MCNC: 79.3 NG/ML (ref 30–100)
VLDLC SERPL CALC-MCNC: 8 MG/DL (ref 0–30)
WBC # BLD AUTO: 5 X10(3) UL (ref 4–11)

## 2024-06-19 PROCEDURE — 87389 HIV-1 AG W/HIV-1&-2 AB AG IA: CPT

## 2024-06-19 PROCEDURE — 36415 COLL VENOUS BLD VENIPUNCTURE: CPT

## 2024-06-19 PROCEDURE — 80053 COMPREHEN METABOLIC PANEL: CPT

## 2024-06-19 PROCEDURE — 82306 VITAMIN D 25 HYDROXY: CPT

## 2024-06-19 PROCEDURE — 84443 ASSAY THYROID STIM HORMONE: CPT

## 2024-06-19 PROCEDURE — 85025 COMPLETE CBC W/AUTO DIFF WBC: CPT

## 2024-06-19 PROCEDURE — 80061 LIPID PANEL: CPT

## 2024-06-19 PROCEDURE — 82607 VITAMIN B-12: CPT

## 2024-10-25 ENCOUNTER — HOSPITAL ENCOUNTER (OUTPATIENT)
Age: 34
Discharge: HOME | End: 2024-10-25
Payer: COMMERCIAL

## 2024-10-25 VITALS
RESPIRATION RATE: 18 BRPM | SYSTOLIC BLOOD PRESSURE: 106 MMHG | WEIGHT: 119 LBS | OXYGEN SATURATION: 99 % | BODY MASS INDEX: 22.47 KG/M2 | HEIGHT: 61 IN | TEMPERATURE: 98 F | HEART RATE: 73 BPM | DIASTOLIC BLOOD PRESSURE: 66 MMHG

## 2024-10-25 DIAGNOSIS — N30.01 ACUTE CYSTITIS WITH HEMATURIA: Primary | ICD-10-CM

## 2024-10-25 LAB
B-HCG UR QL: NEGATIVE
BILIRUB UR QL STRIP: NEGATIVE
CLARITY UR: CLEAR
GLUCOSE UR STRIP-MCNC: 100 MG/DL
HGB UR QL STRIP: NEGATIVE
NITRITE UR QL STRIP: POSITIVE
PH UR STRIP: 5 [PH]
PROT UR STRIP-MCNC: 30 MG/DL
SP GR UR STRIP: 1.01
UROBILINOGEN UR STRIP-ACNC: 2 MG/DL

## 2024-10-25 PROCEDURE — 81514 NFCT DS BV&VAGINITIS DNA ALG: CPT | Performed by: NURSE PRACTITIONER

## 2024-10-25 PROCEDURE — 99214 OFFICE O/P EST MOD 30 MIN: CPT

## 2024-10-25 PROCEDURE — 81025 URINE PREGNANCY TEST: CPT

## 2024-10-25 PROCEDURE — 87086 URINE CULTURE/COLONY COUNT: CPT | Performed by: NURSE PRACTITIONER

## 2024-10-25 PROCEDURE — 81002 URINALYSIS NONAUTO W/O SCOPE: CPT

## 2024-10-25 RX ORDER — CEPHALEXIN 500 MG/1
500 CAPSULE ORAL 2 TIMES DAILY
Qty: 14 CAPSULE | Refills: 0 | Status: SHIPPED | OUTPATIENT
Start: 2024-10-25 | End: 2024-11-01

## 2024-10-25 NOTE — ED PROVIDER NOTES
Patient Seen in: [facility]      History     Chief Complaint   Patient presents with    Urinary Symptoms     Stated Complaint: UTI    Subjective:   HPI  32 yo female with anxiety, sab, and varicella presents with 2 days of dysuria, urgency, and frequency for 2 days. She took Azo. Denies n/v, fever, abd/flank pain.    Objective:     Past Medical History:    Anxiety    SAB (spontaneous ) ([facility])    Varicella              History reviewed. No pertinent surgical history.             Social History     Socioeconomic History    Marital status:    Tobacco Use    Smoking status: Never    Smokeless tobacco: Never   Vaping Use    Vaping status: Never Used   Substance and Sexual Activity    Alcohol use: Not Currently     Comment: social    Drug use: No    Sexual activity: Yes     Partners: Male     Birth control/protection: I.U.D., Mirena   Other Topics Concern    Caffeine Concern Yes     Comment: Drink alot and still tired    Exercise No    Seat Belt Yes    Special Diet No    Stress Concern No    Weight Concern No              Review of Systems   All other systems reviewed and are negative.      Positive for stated complaint: UTI  Other systems are as noted in HPI.  Constitutional and vital signs reviewed.      All other systems reviewed and negative except as noted above.    Physical Exam     ED Triage Vitals [10/25/24 1732]   /66   Pulse 73   Resp 18   Temp 98 °F (36.7 °C)   Temp src Temporal   SpO2 99 %   O2 Device None (Room air)       Current Vitals:   Vital Signs  BP: 106/66  Pulse: 73  Resp: 18  Temp: 98 °F (36.7 °C)  Temp src: Temporal    Oxygen Therapy  SpO2: 99 %  O2 Device: None (Room air)        Physical Exam  Vitals and nursing note reviewed.   Constitutional:       Appearance: She is well-developed. She is not ill-appearing or toxic-appearing.   Cardiovascular:      Rate and Rhythm: Normal rate.   Pulmonary:      Effort: Pulmonary effort is normal.   Abdominal:       Tenderness: There is no right CVA tenderness or left CVA tenderness.   Skin:     General: Skin is warm and dry.   Neurological:      Mental Status: She is alert and oriented to person, place, and time.             ED Course     Labs Reviewed   [facility] POCT URINALYSIS DIPSTICK - Abnormal; Notable for the following components:       Result Value    Urine Color Orange (*)     Protein urine 30 (*)     Glucose, Urine 100 (*)     Ketone, Urine Trace (*)     Nitrite Urine Positive (*)     Urobilinogen urine 2.0 (*)     Leukocyte esterase urine Large (*)     All other components within normal limits   POCT PREGNANCY URINE - Normal   URINE CULTURE, ROUTINE   VAGINITIS VAGINOSIS PCR PANEL                   MDM     Medical Decision Making  34 yo female with anxiety, sab, and varicella presents with 2 days of dysuria, urgency, and frequency for 2 days. She took Azo. Denies n/v, fever, abd/flank pain.    Pertinent Labs & Imaging studies reviewed. (See chart for details).  Patient coming in with uti sx.   Differential diagnosis includes but not limited to uti, vaginitis, pyelo, kidney stone  Labs reviewed pregnancy -, urine dip skewed due to Azo. Culture and vaginitis sent.   Will treat for  uti.  Will discharge on keflex and will tx according to vaginitis result pt self swabbed. Patient/Parent is comfortable with this plan.    Overall Pt looks good. Non-toxic, well-hydrated and in no respiratory distress. Vital signs are reassuring. Exam is reassuring. I do not believe pt requires and additional diagnostic studies or intervention. I believe pt can be discharged home to continue evaluation as an outpatient. Follow-up provider given. Discharge instructions given and reviewed. Return for any problems. All understand and agree with the plan.  Pt preferred vaginitis self swab and declined GC testing.      Problems Addressed:  Acute cystitis with hematuria: acute illness or injury        Disposition and Plan     Clinical Impression:  1.  Acute cystitis with hematuria         Disposition:  Discharge  10/25/2024  6:09 pm    Follow-up:  No follow-up provider specified.        Medications Prescribed:  Discharge Medication List as of 10/25/2024  6:12 PM        START taking these medications    Details   cephALEXin 500 MG Oral Cap Take 1 capsule (500 mg total) by mouth 2 (two) times daily for 7 days., Normal, Disp-14 capsule, R-0                 Supplementary Documentation:

## 2024-10-25 NOTE — ED INITIAL ASSESSMENT (HPI)
For the past 2 days, patient complains of increased urinary frequency, urgency and dysuria.  Denies hematuria or fevers.

## 2024-10-25 NOTE — DISCHARGE INSTRUCTIONS
Start antibiotic.  We will call with vaginitis if positive or if you need to change antibiotic from urine culture.

## 2024-10-26 LAB
BV BACTERIA DNA VAG QL NAA+PROBE: POSITIVE
C GLABRATA DNA VAG QL NAA+PROBE: NEGATIVE
C KRUSEI DNA VAG QL NAA+PROBE: NEGATIVE
CANDIDA DNA VAG QL NAA+PROBE: POSITIVE
T VAGINALIS DNA VAG QL NAA+PROBE: NEGATIVE

## 2024-10-26 RX ORDER — FLUCONAZOLE 150 MG/1
150 TABLET ORAL ONCE
Qty: 1 TABLET | Refills: 0 | Status: SHIPPED | OUTPATIENT
Start: 2024-10-26 | End: 2024-10-26

## 2024-10-26 RX ORDER — METRONIDAZOLE 500 MG/1
500 TABLET ORAL 2 TIMES DAILY
Qty: 14 TABLET | Refills: 0 | Status: SHIPPED | OUTPATIENT
Start: 2024-10-26 | End: 2024-11-02

## 2024-10-30 ENCOUNTER — TELEPHONE (OUTPATIENT)
Dept: OBGYN CLINIC | Facility: CLINIC | Age: 34
End: 2024-10-30

## 2024-10-30 NOTE — TELEPHONE ENCOUNTER
[facility] and asking for a f/u for BV/UTI-neg and would like to discuss with a nurse.  Would like to discuss whether follow up is needed.    Patient has a full-time job and asking for an appointment to fit around that sooner than 11/9    Pls advise

## 2024-10-30 NOTE — ED NOTES
Pt was seen for gyne exam and treated.  Pt states symptoms are not resolving.  Recommended that pt follow up with gynecologist for further testing and treatment.  Pt verbalized understanding

## 2024-10-30 NOTE — TELEPHONE ENCOUNTER
Patient seen in urgent care this past Friday. Was given Keflex for UTI noted on dip and culture sent. Swabs completed noted bacterial vaginosis and yeast. Patient was then called on 10/26, given Flagyl PO BID x7 days and Diflucan x1 dose. Instructed to stop Keflex since urine culture negative.     Patient state vaginal area is swollen and uncomfortable. Also having mild cramping. Denies any discharge or odor. Still has increase in urination, at times she feels bladder is not completely empty.     Informed to continue with Flagyl. Encouraged to keep up hydration. Patient was using AZO previously, so informed will need to get recommendations before restarting. Patient was offered appointment, states current openings do not work with scheduled, plus patient now lives out near [place]. She may look for Gyne closer to home, informed can review [facility] on our website.     Informed will send message to Laila Pink for review and recommendations.     To Laila Pink please review and advise. Thank you.

## 2024-10-31 NOTE — TELEPHONE ENCOUNTER
Should patient repeat U/A? Or should she start with Kelfex again?      To Laila Pink for recommendations

## 2024-10-31 NOTE — TELEPHONE ENCOUNTER
If UA and culture negative no need to repeat. Bladder may be irritated from vaginal infections. Finish medications and if not improved needs appointment

## 2024-11-06 ENCOUNTER — TELEPHONE (OUTPATIENT)
Dept: OBGYN CLINIC | Facility: CLINIC | Age: 34
End: 2024-11-06

## 2024-11-06 DIAGNOSIS — R39.15 URINARY URGENCY: Primary | ICD-10-CM

## 2024-11-06 DIAGNOSIS — R35.0 URINARY FREQUENCY: ICD-10-CM

## 2024-11-06 NOTE — TELEPHONE ENCOUNTER
Patient continues to have frequent urination and regularly has an urge to go that is accompanied by cramping. Scheduled for an urgent care follow up on Saturday. Hoping for further guidance. Please call.

## 2024-11-06 NOTE — TELEPHONE ENCOUNTER
Mini with persisting UTI symptoms, urgency, frequency, incomplete emptying.   See 10/30/24.     Ordered repeat UA. She will keep upcoming appointment with Laila Pink 11/9.     Await results.

## 2024-11-09 ENCOUNTER — OFFICE VISIT (OUTPATIENT)
Dept: OBGYN CLINIC | Facility: CLINIC | Age: 34
End: 2024-11-09

## 2024-11-09 ENCOUNTER — LAB ENCOUNTER (OUTPATIENT)
Dept: LAB | Facility: HOSPITAL | Age: 34
End: 2024-11-09
Attending: NURSE PRACTITIONER
Payer: COMMERCIAL

## 2024-11-09 VITALS
BODY MASS INDEX: 23 KG/M2 | HEART RATE: 72 BPM | WEIGHT: 122 LBS | SYSTOLIC BLOOD PRESSURE: 100 MMHG | DIASTOLIC BLOOD PRESSURE: 68 MMHG

## 2024-11-09 DIAGNOSIS — N89.8 VAGINAL DISCHARGE: ICD-10-CM

## 2024-11-09 DIAGNOSIS — R35.0 URINARY FREQUENCY: Primary | ICD-10-CM

## 2024-11-09 DIAGNOSIS — R35.0 URINARY FREQUENCY: ICD-10-CM

## 2024-11-09 LAB
BILIRUB UR QL: NEGATIVE
CLARITY UR: CLEAR
GLUCOSE UR-MCNC: NORMAL MG/DL
HGB UR QL STRIP.AUTO: NEGATIVE
KETONES UR-MCNC: NEGATIVE MG/DL
LEUKOCYTE ESTERASE UR QL STRIP.AUTO: NEGATIVE
NITRITE UR QL STRIP.AUTO: NEGATIVE
PH UR: 6 [PH] (ref 5–8)
PROT UR-MCNC: NEGATIVE MG/DL
SP GR UR STRIP: 1.01 (ref 1–1.03)
UROBILINOGEN UR STRIP-ACNC: NORMAL

## 2024-11-09 PROCEDURE — 81003 URINALYSIS AUTO W/O SCOPE: CPT

## 2024-11-09 PROCEDURE — 3074F SYST BP LT 130 MM HG: CPT | Performed by: NURSE PRACTITIONER

## 2024-11-09 PROCEDURE — 3078F DIAST BP <80 MM HG: CPT | Performed by: NURSE PRACTITIONER

## 2024-11-09 PROCEDURE — 87086 URINE CULTURE/COLONY COUNT: CPT

## 2024-11-09 PROCEDURE — 99213 OFFICE O/P EST LOW 20 MIN: CPT | Performed by: NURSE PRACTITIONER

## 2024-11-09 NOTE — PROGRESS NOTES
[facility]   Obstetrics and Gynecology    Mini Henning is a 33 year old female  Patient's last menstrual period was 10/01/2024 (approximate).   Chief Complaint   Patient presents with    Follow - Up     Follow up from urgent care. Patient states she's still having some symptoms.    .   She went to [facility] on 10/25/2024. Symptoms today better. She reports having low pelvic cramping and urinary frequency today.    She was tested for bacterial vaginosis - treated for bacterial vaginosis and yeast. Urine culture negative but started uti meds until culture came back negative.    She still is concerned having UTI symptom after finishing medications for bacterial vaginosis and yeast. Done with meds x 1 week    Sexually active with her same partner.     Pap:3/2024 NILM, negative HPV  Contraception: mirena IUD - placed in 2019.     OBSTETRICS HISTORY:  OB History    Para Term  AB Living   3 1 1 0 2 1   SAB IAB Ectopic Multiple Live Births   2 0 0 0 1       GYNE HISTORY:  Menarche: 9 y/o (2024 11:43 AM)  Period Cycle (Days): spotting occasionally with IUD (2024 11:43 AM)  Period Duration (Days): varies (2024 11:43 AM)  Use of Birth Control (if yes, specify type): Mirena IUD (3/18/19) (2024 11:43 AM)  Hx Prior Abnormal Pap: No (2024 11:43 AM)  Pap Date: 24 (2024 11:43 AM)  Pap Result Notes: Neg Pap, HPV neg //// (2024 11:43 AM)      History   Sexual Activity    Sexual activity: Yes    Partners: Male    Birth control/ protection: I.U.D., Mirena       MEDICAL HISTORY:  Past Medical History:    Anxiety    SAB (spontaneous ) ([facility])    Varicella       SOCIAL HISTORY:  Social History     Socioeconomic History    Marital status:      Spouse name: Not on file    Number of children: Not on file    Years of education: Not on file    Highest education level: Not on file   Occupational History    Not on file   Tobacco Use    Smoking status:  Never    Smokeless tobacco: Never   Vaping Use    Vaping status: Never Used   Substance and Sexual Activity    Alcohol use: Not Currently     Comment: social    Drug use: No    Sexual activity: Yes     Partners: Male     Birth control/protection: I.U.D., Mirena   Other Topics Concern    Caffeine Concern Yes     Comment: Drink alot and still tired    Exercise No    Seat Belt Yes    Special Diet No    Stress Concern No    Weight Concern No   Social History Narrative    Not on file     Social Drivers of Health     Financial Resource Strain: Not on file   Food Insecurity: Not on file   Transportation Needs: Not on file   Physical Activity: Not on file   Stress: Not on file   Social Connections: Not on file   Housing Stability: Not on file       MEDICATIONS:    Current Outpatient Medications:     cholecalciferol 125 MCG (5000 UT) Oral Tab, Take 1 tablet (5,000 Units total) by mouth daily., Disp: , Rfl:     Levonorgestrel 20 MCG/24HR Intrauterine IUD, 20 mcg (1 each total) by Intrauterine route once., Disp: , Rfl:     ALLERGIES:  Allergies[1]      Review of Systems:  Constitutional:  Denies fatigue, night sweats, hot flashes  Cardiovascular:  denies chest pain or palpitations  Respiratory:  denies shortness of breath  Gastrointestinal:  denies heartburn, abdominal pain, diarrhea or constipation  Genitourinary:   see HPI  Musculoskeletal:  denies back pain.  Skin/Breast:  Denies any breast pain, lumps, or discharge.   Neurological:  denies headaches, extremity weakness or numbness.  Psychiatric: denies depression or anxiety.  Endocrine:   denies excessive thirst or urination.  Heme/Lymph:  denies history of anemia, easy bruising or bleeding.      PHYSICAL EXAM:     Vitals:    11/09/24 1144   BP: 100/68   Pulse: 72   Weight: 122 lb (55.3 kg)     Body mass index is 23.05 kg/m².     Patient offered chaperone, patient declined    Constitutional: well developed, well nourished    Psychiatric:  Oriented to time, place, person  and situation. Appropriate mood and affect    Pelvic Exam:  External Genitalia: normal appearance, hair distribution, and no lesions  Urethral Meatus:  normal in size, location, without lesions and prolapse  Bladder:  No fullness, masses or tenderness  Vagina:  Normal appearance without lesions, no abnormal discharge  Cervix:  +IUD strings, Normal without tenderness on motion  Uterus: normal in size, contour, position, mobility, without tenderness  Adnexa: normal without masses or tenderness  Perineum: normal  Anus: no hemorroids   Lymph node: no inguinal lymph nodes    Assessment & Plan:  Mini was seen today for follow - up.    Diagnoses and all orders for this visit:    Urinary frequency  -     Urinalysis, Routine; Future  -     Urine Culture, Routine; Future    Vaginal discharge  -     Vaginitis Vaginosis PCR Panel; Future  -     Vaginitis Vaginosis PCR Panel    IUD in place  Culture done  UA and urine culture      ASHWINI Donald    This note was prepared using Dragon [employer] voice recognition dictation software. As a result errors may occur. When identified these errors have been corrected. While every attempt is made to correct errors during dictation discrepancies may still exist.         [1] No Known Allergies

## 2024-11-10 LAB
BV BACTERIA DNA VAG QL NAA+PROBE: NEGATIVE
C GLABRATA DNA VAG QL NAA+PROBE: NEGATIVE
C KRUSEI DNA VAG QL NAA+PROBE: NEGATIVE
CANDIDA DNA VAG QL NAA+PROBE: POSITIVE
T VAGINALIS DNA VAG QL NAA+PROBE: NEGATIVE

## 2024-11-11 ENCOUNTER — PATIENT MESSAGE (OUTPATIENT)
Dept: OBGYN CLINIC | Facility: CLINIC | Age: 34
End: 2024-11-11

## 2024-11-11 RX ORDER — FLUCONAZOLE 150 MG/1
150 TABLET ORAL AS DIRECTED
Qty: 2 TABLET | Refills: 0 | Status: SHIPPED | OUTPATIENT
Start: 2024-11-11

## 2025-02-16 ENCOUNTER — HOSPITAL ENCOUNTER (OUTPATIENT)
Age: 35
Discharge: HOME | End: 2025-02-16
Attending: EMERGENCY MEDICINE
Payer: COMMERCIAL

## 2025-02-16 VITALS
TEMPERATURE: 98 F | OXYGEN SATURATION: 98 % | SYSTOLIC BLOOD PRESSURE: 105 MMHG | HEART RATE: 84 BPM | RESPIRATION RATE: 16 BRPM | DIASTOLIC BLOOD PRESSURE: 71 MMHG

## 2025-02-16 DIAGNOSIS — R30.0 DYSURIA: Primary | ICD-10-CM

## 2025-02-16 LAB
B-HCG UR QL: NEGATIVE
BILIRUB UR QL STRIP: NEGATIVE
CLARITY UR: CLEAR
COLOR UR: YELLOW
GLUCOSE UR STRIP-MCNC: NEGATIVE MG/DL
HGB UR QL STRIP: NEGATIVE
KETONES UR STRIP-MCNC: NEGATIVE MG/DL
LEUKOCYTE ESTERASE UR QL STRIP: NEGATIVE
NITRITE UR QL STRIP: NEGATIVE
PH UR STRIP: 7 [PH]
PROT UR STRIP-MCNC: NEGATIVE MG/DL
SP GR UR STRIP: 1.01
UROBILINOGEN UR STRIP-ACNC: <2 MG/DL

## 2025-02-16 PROCEDURE — 81025 URINE PREGNANCY TEST: CPT

## 2025-02-16 PROCEDURE — 81002 URINALYSIS NONAUTO W/O SCOPE: CPT

## 2025-02-16 PROCEDURE — 81514 NFCT DS BV&VAGINITIS DNA ALG: CPT | Performed by: EMERGENCY MEDICINE

## 2025-02-16 PROCEDURE — 99214 OFFICE O/P EST MOD 30 MIN: CPT

## 2025-02-16 NOTE — ED PROVIDER NOTES
Patient Seen in: [facility]      History     Chief Complaint   Patient presents with    Urinary Symptoms     UTI symptoms , maybe yeast infection or BV - Entered by patient     Stated Complaint: Urinary Symptoms - UTI symptoms , maybe yeast infection or BV    Subjective:   HPI      Dysuria, urgency and frequency for a few days. No flank or abdominal pain. No fever. No vomiting. No vaginal discharge. Also concerned for BV as she has had similar symptoms with BV in the past.     Objective:     Past Medical History:    Anxiety    SAB (spontaneous ) ([facility])    Varicella              History reviewed. No pertinent surgical history.             Social History     Socioeconomic History    Marital status:    Tobacco Use    Smoking status: Never    Smokeless tobacco: Never   Vaping Use    Vaping status: Never Used   Substance and Sexual Activity    Alcohol use: Not Currently     Comment: social    Drug use: No    Sexual activity: Yes     Partners: Male     Birth control/protection: I.U.D., Mirena   Other Topics Concern    Caffeine Concern Yes     Comment: Drink alot and still tired    Exercise No    Seat Belt Yes    Special Diet No    Stress Concern No    Weight Concern No              Review of Systems    Positive for stated complaint: Urinary Symptoms - UTI symptoms , maybe yeast infection or BV  Other systems are as noted in HPI.  Constitutional and vital signs reviewed.      All other systems reviewed and negative except as noted above.    Physical Exam     ED Triage Vitals [25 0915]   /71   Pulse 84   Resp 16   Temp 98.1 °F (36.7 °C)   Temp src    SpO2 98 %   O2 Device None (Room air)       Current Vitals:   Vital Signs  BP: 105/71  Pulse: 84  Resp: 16  Temp: 98.1 °F (36.7 °C)    Oxygen Therapy  SpO2: 98 %  O2 Device: None (Room air)        Physical Exam  Vitals and nursing note reviewed.   Constitutional:       Appearance: Normal appearance. She is well-developed.   HENT:       Head: Normocephalic and atraumatic.   Cardiovascular:      Rate and Rhythm: Normal rate and regular rhythm.   Pulmonary:      Effort: Pulmonary effort is normal. No respiratory distress.   Abdominal:      General: There is no distension.      Palpations: Abdomen is soft.      Tenderness: There is no abdominal tenderness. There is no right CVA tenderness or left CVA tenderness.   Skin:     General: Skin is warm and dry.      Capillary Refill: Capillary refill takes less than 2 seconds.   Neurological:      General: No focal deficit present.      Mental Status: She is alert.   Psychiatric:         Mood and Affect: Mood normal.         Behavior: Behavior normal.            ED Course     Labs Reviewed   POCT PREGNANCY URINE - Normal   [facility] POCT URINALYSIS DIPSTICK   VAGINITIS VAGINOSIS PCR PANEL                   MDM           Medical Decision Making  Bladder infection, BV, interstitial cystitis, overactive bladder syndrome all in differential. UA reviewed by myself and is normal. Vaginosis self swab is pending at time of discharge.   Will await results for treatment.     Disposition and Plan     Clinical Impression:  1. Dysuria         Disposition:  Discharge  2/16/2025  9:44 am    Follow-up:  Huong Guerrero DO  [address] [place] [id number] [phone]      As needed          Medications Prescribed:  Discharge Medication List as of 2/16/2025  9:46 AM              Supplementary Documentation:

## 2025-02-17 RX ORDER — FLUCONAZOLE 150 MG/1
150 TABLET ORAL ONCE
Qty: 1 TABLET | Refills: 0 | Status: SHIPPED | OUTPATIENT
Start: 2025-02-17 | End: 2025-02-17

## 2025-02-17 NOTE — PROGRESS NOTES
Called patient for follow up. Instructions provided for medication. All questions answered, patient verbalized understanding.

## 2025-02-17 NOTE — PROGRESS NOTES
Patient not treated for anything. Please return to [facility] nurse pool with orders.

## 2025-02-28 ENCOUNTER — APPOINTMENT (OUTPATIENT)
Dept: GENERAL RADIOLOGY | Age: 35
End: 2025-02-28
Attending: NURSE PRACTITIONER
Payer: COMMERCIAL

## 2025-02-28 ENCOUNTER — HOSPITAL ENCOUNTER (OUTPATIENT)
Age: 35
Discharge: HOME | End: 2025-02-28
Payer: COMMERCIAL

## 2025-02-28 VITALS
RESPIRATION RATE: 16 BRPM | HEART RATE: 71 BPM | SYSTOLIC BLOOD PRESSURE: 104 MMHG | OXYGEN SATURATION: 100 % | DIASTOLIC BLOOD PRESSURE: 65 MMHG | TEMPERATURE: 99 F

## 2025-02-28 DIAGNOSIS — J10.1 INFLUENZA A: Primary | ICD-10-CM

## 2025-02-28 DIAGNOSIS — J02.0 STREPTOCOCCAL SORE THROAT: ICD-10-CM

## 2025-02-28 LAB
POCT INFLUENZA A: POSITIVE
POCT INFLUENZA B: NEGATIVE
S PYO AG THROAT QL IA.RAPID: POSITIVE
SARS-COV-2 RNA RESP QL NAA+PROBE: NOT DETECTED

## 2025-02-28 PROCEDURE — 87651 STREP A DNA AMP PROBE: CPT | Performed by: NURSE PRACTITIONER

## 2025-02-28 PROCEDURE — 71046 X-RAY EXAM CHEST 2 VIEWS: CPT | Performed by: NURSE PRACTITIONER

## 2025-02-28 PROCEDURE — 87502 INFLUENZA DNA AMP PROBE: CPT | Performed by: NURSE PRACTITIONER

## 2025-02-28 PROCEDURE — 99214 OFFICE O/P EST MOD 30 MIN: CPT

## 2025-02-28 RX ORDER — OSELTAMIVIR PHOSPHATE 75 MG/1
75 CAPSULE ORAL 2 TIMES DAILY
Qty: 10 CAPSULE | Refills: 0 | Status: SHIPPED | OUTPATIENT
Start: 2025-02-28 | End: 2025-03-05

## 2025-02-28 RX ORDER — AMOXICILLIN 500 MG/1
500 TABLET, FILM COATED ORAL 2 TIMES DAILY
Qty: 20 TABLET | Refills: 0 | Status: SHIPPED | OUTPATIENT
Start: 2025-02-28 | End: 2025-03-10

## 2025-02-28 NOTE — ED PROVIDER NOTES
Patient Seen in: [facility]      History     Chief Complaint   Patient presents with    Cough     Sore throat , really hurts when I cough , barking couch , congestion - Entered by patient     Stated Complaint: Cough - Sore throat , really hurts when I cough , barking couch , congestion    Subjective:   This is a 34-year-old female with no significant past medical history.  Presents to immediate care for sore throat, nasal congestion, and barking cough.  Symptoms started Wednesday.  She is having chest discomfort only when coughing.  No fevers but feels chills.   is sick with similar symptoms.  No difficulty breathing or wheezing.  No shortness of breath.  Recently returned from [place].  No treatment attempted prior to arrival.     The history is provided by the patient.             Objective:     Past Medical History:    Anxiety    SAB (spontaneous ) ([facility])    Varicella              History reviewed. No pertinent surgical history.             Social History     Socioeconomic History    Marital status:    Tobacco Use    Smoking status: Never    Smokeless tobacco: Never   Vaping Use    Vaping status: Never Used   Substance and Sexual Activity    Alcohol use: Not Currently     Comment: social    Drug use: No    Sexual activity: Yes     Partners: Male     Birth control/protection: I.U.D., Mirena   Other Topics Concern    Caffeine Concern Yes     Comment: Drink alot and still tired    Exercise No    Seat Belt Yes    Special Diet No    Stress Concern No    Weight Concern No              Review of Systems   Constitutional:  Positive for chills. Negative for fever.   HENT:  Positive for congestion and sore throat.    Respiratory:  Positive for cough. Negative for shortness of breath, wheezing and stridor.    Cardiovascular:  Negative for chest pain, palpitations and leg swelling.   Gastrointestinal:  Negative for abdominal pain, constipation, diarrhea, nausea and vomiting.    Genitourinary:  Negative for dysuria.   Musculoskeletal:  Negative for back pain, neck pain and neck stiffness.   Skin:  Negative for rash.   Neurological:  Negative for headaches.       Positive for stated complaint: Cough - Sore throat , really hurts when I cough , barking couch , congestion  Other systems are as noted in HPI.  Constitutional and vital signs reviewed.      All other systems reviewed and negative except as noted above.    Physical Exam     ED Triage Vitals [02/28/25 1113]   /65   Pulse 71   Resp 16   Temp 99.4 °F (37.4 °C)   Temp src Oral   SpO2 100 %   O2 Device None (Room air)       Current Vitals:   Vital Signs  BP: 104/65  Pulse: 71  Resp: 16  Temp: 99.4 °F (37.4 °C)  Temp src: Oral    Oxygen Therapy  SpO2: 100 %  O2 Device: None (Room air)        Physical Exam  Vitals and nursing note reviewed.   Constitutional:       General: She is not in acute distress.     Appearance: Normal appearance. She is normal weight. She is not ill-appearing, toxic-appearing or diaphoretic.   HENT:      Head: Normocephalic and atraumatic.      Right Ear: Tympanic membrane, ear canal and external ear normal. There is no impacted cerumen.      Left Ear: Tympanic membrane, ear canal and external ear normal. There is no impacted cerumen.      Nose: Congestion and rhinorrhea present.      Mouth/Throat:      Mouth: Mucous membranes are moist.      Pharynx: Oropharynx is clear. Posterior oropharyngeal erythema present. No oropharyngeal exudate.   Eyes:      General:         Right eye: No discharge.         Left eye: No discharge.      Extraocular Movements: Extraocular movements intact.      Conjunctiva/sclera: Conjunctivae normal.   Cardiovascular:      Rate and Rhythm: Normal rate and regular rhythm.      Heart sounds: Normal heart sounds. No murmur heard.  Pulmonary:      Effort: Pulmonary effort is normal. No respiratory distress.      Breath sounds: Normal breath sounds. No stridor. No wheezing, rhonchi or  rales.   Musculoskeletal:      Cervical back: Neck supple.      Right lower leg: No edema.      Left lower leg: No edema.   Skin:     General: Skin is warm and dry.      Capillary Refill: Capillary refill takes less than 2 seconds.      Findings: No rash.   Neurological:      Mental Status: She is alert and oriented to person, place, and time.   Psychiatric:         Mood and Affect: Mood normal.         Behavior: Behavior normal.             ED Course     Labs Reviewed   RAPID STREP A - Abnormal; Notable for the following components:       Result Value    Strep A by PCR Positive (*)     All other components within normal limits   POCT FLU TEST - Abnormal; Notable for the following components:    POCT INFLUENZA A Positive (*)     All other components within normal limits    Narrative:     This assay is a rapid molecular in vitro test utilizing nucleic acid amplification of influenza A and B viral RNA.   RAPID SARS-COV-2 BY PCR - Normal            Rapid strep, rapid covid, rapid flu, chest x ray       MDM      Vital signs stable. Patient is well-appearing and nontoxic looking. Presents to immediate care for upper respiratory symptoms.    Differential diagnosis include but not limited to COVID, viral sinusitis, strep influenza, other viral URI, pneumonia     Lung sounds clear bilaterally. No respiratory distress or hypoxia.  Chest x-ray films interpreted and reviewed myself.  Results show no acute findings.       Rapid COVID is negative.  Rapid flu and rapid strep are positive.    We discussed risks and benefits of Tamiflu.  Patient verbalizes understanding and agrees to move forward with treatment.    DC home.  Rx given for Tamiflu and amoxicillin.  Infection precautions reviewed.  Tylenol and/or ibuprofen for pain or fever. The importance of oral hydration and close follow-up with primary provider in 1 week discussed. Reasons to seek emergent treatment reinforced. Patient verbalized understanding, and agreed with  plan of care. All questions answered.          Medical Decision Making      Disposition and Plan     Clinical Impression:  1. Influenza A    2. Streptococcal sore throat         Disposition:  Discharge  2/28/2025 12:06 pm    Follow-up:  Huong Guerrero DO  [address] [place] [id number] [phone]    In 1 week            Medications Prescribed:  Current Discharge Medication List        START taking these medications    Details   amoxicillin 500 MG Oral Tab Take 1 tablet (500 mg total) by mouth 2 (two) times daily for 10 days.  Qty: 20 tablet, Refills: 0      oseltamivir 75 MG Oral Cap Take 1 capsule (75 mg total) by mouth 2 (two) times daily for 5 days.  Qty: 10 capsule, Refills: 0                 Supplementary Documentation:

## 2025-02-28 NOTE — DISCHARGE INSTRUCTIONS
You are diagnosed with influenza and strep.  Take amoxicillin and Tamiflu as prescribed. You are contagious to others until you have been fever free for 24 hours with no fever reducing medicines and your symptoms improve.  Tylenol and or ibuprofen for pain or fever.  OTC cough medications.  Stay hydrated.  Follow-up with your PCP.

## 2025-04-08 ENCOUNTER — TELEPHONE (OUTPATIENT)
Dept: OBGYN CLINIC | Facility: CLINIC | Age: 35
End: 2025-04-08

## 2025-04-09 ENCOUNTER — OFFICE VISIT (OUTPATIENT)
Dept: FAMILY MEDICINE CLINIC | Facility: CLINIC | Age: 35
End: 2025-04-09
Payer: COMMERCIAL

## 2025-04-09 VITALS
TEMPERATURE: 98 F | WEIGHT: 130 LBS | RESPIRATION RATE: 16 BRPM | OXYGEN SATURATION: 100 % | DIASTOLIC BLOOD PRESSURE: 70 MMHG | BODY MASS INDEX: 24.55 KG/M2 | HEIGHT: 61 IN | HEART RATE: 72 BPM | SYSTOLIC BLOOD PRESSURE: 117 MMHG

## 2025-04-09 DIAGNOSIS — J02.0 STREP PHARYNGITIS: Primary | ICD-10-CM

## 2025-04-09 DIAGNOSIS — J02.9 SORE THROAT: ICD-10-CM

## 2025-04-09 LAB
CONTROL LINE PRESENT WITH A CLEAR BACKGROUND (YES/NO): YES YES/NO
KIT LOT #: NORMAL NUMERIC

## 2025-04-09 PROCEDURE — 3074F SYST BP LT 130 MM HG: CPT | Performed by: NURSE PRACTITIONER

## 2025-04-09 PROCEDURE — 3078F DIAST BP <80 MM HG: CPT | Performed by: NURSE PRACTITIONER

## 2025-04-09 PROCEDURE — 3008F BODY MASS INDEX DOCD: CPT | Performed by: NURSE PRACTITIONER

## 2025-04-09 PROCEDURE — 99213 OFFICE O/P EST LOW 20 MIN: CPT | Performed by: NURSE PRACTITIONER

## 2025-04-09 PROCEDURE — 87880 STREP A ASSAY W/OPTIC: CPT | Performed by: NURSE PRACTITIONER

## 2025-04-09 RX ORDER — AMOXICILLIN 500 MG/1
500 TABLET, FILM COATED ORAL 2 TIMES DAILY
Qty: 20 TABLET | Refills: 0 | Status: SHIPPED | OUTPATIENT
Start: 2025-04-09 | End: 2025-04-19

## 2025-04-09 NOTE — PROGRESS NOTES
CHIEF COMPLAINT:     Chief Complaint   Patient presents with    Sore Throat     X 4 days, body aches x 1 day, denies fever, OTC tylenol and ibuprofen       HPI:   Mini Henning is a 34 year old female presents to clinic with symptoms of sore throat. Patient has had for 5 days. Symptoms have been consistent since onset.  Patient reports following associated symptoms: body aches. Patient denies headache. Patient denies stomach upset. Patient denies rash.  Patient reports history of strep. Patient denies strep pharyngitis exposure.  Treating symptoms with: OTC      Current Medications[1]   Past Medical History[2]   Social History:  Short Social Hx on File[3]     REVIEW OF SYSTEMS:   GENERAL HEALTH:  See HPI  SKIN: see HPI  HEENT: denies ear pain, See HPI  RESPIRATORY: denies shortness of breath, or wheezing  CARDIOVASCULAR: denies chest pain, palpitations   GI: denies abdominal pain, constipation and diarrhea  NEURO: denies dizziness or lightheadedness    EXAM:   /70   Pulse 72   Temp 97.5 °F (36.4 °C)   Resp 16   Ht 5' 1\" (1.549 m)   Wt 130 lb (59 kg)   LMP 04/01/2025 (Within Days)   SpO2 100%   Breastfeeding No   BMI 24.56 kg/m²   GENERAL: well developed, well nourished,in no apparent distress  SKIN: no rashes,no suspicious lesions  HEAD: atraumatic, normocephalic  EYES: conjunctiva clear, EOM intact  EARS: TM's clear, non-injected, no bulging, retraction, or fluid  NOSE: nostrils patent, no exudates, nasal mucosa pink and noninflamed  THROAT: oral mucosa pink, moist. Posterior pharynx erythematous and injected. no exudates. Tonsils 2/4.  Breath is not malodorous. No uvular deviation. No drooling.  NECK: supple  LUNGS: clear to auscultation bilaterally, no wheezes or rhonchi. Breathing is non labored.  CARDIO: RRR without murmur  GI: good BS's,no masses, hepatosplenomegaly, or tenderness on direct palpation  EXTREMITIES: no cyanosis or edema  LYMPH: + anterior cervical. no submandibular  lymphadenopathy.  No posterior cervical or occipital lymphadenopathy.    Recent Results (from the past 24 hours)   Strep A Assay W/Optic    Collection Time: 04/09/25 11:52 AM   Result Value Ref Range    Strep Grp A Screen pos Negative    Control Line Present with a clear background (yes/no) yes Yes/No    Kit Lot # [id number] Numeric    Kit Expiration Date 12/20/25 Date         ASSESSMENT AND PLAN:   Assessment:   Encounter Diagnoses   Name Primary?    Sore throat     Strep pharyngitis Yes         Plan:  Comfort Measures discussed and listed in Patient Instructions. Prescription: as below.     Requested Prescriptions     Signed Prescriptions Disp Refills    amoxicillin 500 MG Oral Tab 20 tablet 0     Sig: Take 1 tablet (500 mg total) by mouth 2 (two) times daily for 10 days.       Risks, benefits, complications and side effects of meds discussed with patient.     OTC Tylenol/Motrin prn.   Push fluids- warm or cool liquids, whichever is soothing for patient  If treated with antibiotics, change tooth brush after on medication for 48 hours.   Warm salt water gargles 2 times per day for at least 3 days.    Do not share utensils or drinks with anyone.      Follow up with PCP if not improving, condition worsens, or fever greater than or equal to 100.4 persists for 72 hours.      The patient/parent indicates understanding of these issues and agrees to the plan.  The patient is asked to follow up with their PCP prn.         [1]   Current Outpatient Medications   Medication Sig Dispense Refill    amoxicillin 500 MG Oral Tab Take 1 tablet (500 mg total) by mouth 2 (two) times daily for 10 days. 20 tablet 0    fluconazole (DIFLUCAN) 150 MG Oral Tab Take 1 tablet (150 mg total) by mouth As Directed. Take one tablet by mouth today, repeat one tablet by mouth in 3 days 2 tablet 0    cholecalciferol 125 MCG (5000 UT) Oral Tab Take 1 tablet (5,000 Units total) by mouth daily.      Levonorgestrel 20 MCG/24HR Intrauterine IUD 20 mcg (1  each total) by Intrauterine route once.     [2]   Past Medical History:   Anxiety    SAB (spontaneous ) ([facility])    Varicella   [3]   Social History  Socioeconomic History    Marital status:    Tobacco Use    Smoking status: Never    Smokeless tobacco: Never   Vaping Use    Vaping status: Never Used   Substance and Sexual Activity    Alcohol use: Not Currently     Comment: social    Drug use: No    Sexual activity: Yes     Partners: Male     Birth control/protection: I.U.D., Mirena   Other Topics Concern    Caffeine Concern Yes     Comment: Drink alot and still tired    Exercise No    Seat Belt Yes    Special Diet No    Stress Concern No    Weight Concern No

## 2025-04-10 ENCOUNTER — PATIENT MESSAGE (OUTPATIENT)
Dept: OBGYN CLINIC | Facility: CLINIC | Age: 35
End: 2025-04-10

## 2025-05-03 ENCOUNTER — OFFICE VISIT (OUTPATIENT)
Dept: OBGYN CLINIC | Facility: CLINIC | Age: 35
End: 2025-05-03
Payer: COMMERCIAL

## 2025-05-03 VITALS
SYSTOLIC BLOOD PRESSURE: 114 MMHG | WEIGHT: 126.81 LBS | HEART RATE: 69 BPM | BODY MASS INDEX: 24 KG/M2 | DIASTOLIC BLOOD PRESSURE: 78 MMHG

## 2025-05-03 DIAGNOSIS — Z30.432 ENCOUNTER FOR REMOVAL OF INTRAUTERINE CONTRACEPTIVE DEVICE: Primary | ICD-10-CM

## 2025-05-03 NOTE — PROCEDURES
IUD Removal     Consent signed.  Indication: wishes to conceive soon. Hx 2 SAB prior to son    Procedure discussed with the patient in detail including indication, risks, benefits, alternatives and complications.    Pelvic Exam Findings:  Lesion description:  IUD strings seen from cervix    Procedure:  Speculum placed in the vagina.  Betadine wash of vagina and cervix.  An Endocervical speculum was used to visualize strings.  An Allis clamp used to grasp IUD strings.  Mirena IUD was removed without difficulty.  The patient tolerated the procedure well.      Visit Plan:  Discharge instructions were reviewed with the patient.  Once (+) pregn test, plan for progesterone level & ultrasound for viability around 7-8 wks.  May switch MDs due to moved to [place]  Has annual in one month w/ Dr. Taylor.

## 2025-05-03 NOTE — PROGRESS NOTES
The following individual(s) verbally consented to be recorded using ambient AI listening technology and understand that they can each withdraw their consent to this listening technology at any point by asking the clinician to turn off or pause the recording:    Patient name: Mini Deisy Henning  Additional names:

## (undated) NOTE — ED AVS SNAPSHOT
[facility] Emergency Department    [address] [address] [address]    Phone:  [phone]    Fax:  [phone]           Gallito Rose   MRN: [id number]    Department:  [facility] Emergency Department   Date of Visit:  1/28/20 and Class Registration line at [phone] or find a doctor online by visiting [email or web address].    IF THERE IS ANY CHANGE OR WORSENING OF YOUR CONDITION, CALL YOUR PRIMARY CARE PHYSICIAN AT ONCE OR RETURN IMMEDIATELY TO [address] [place] [address].     If

## (undated) NOTE — Clinical Note
IUP at 20w2d Possible Zika exposure Normal level II ultrasound   RECOMMENDATIONS: Continue care with Dr. Shelbi Valerio

## (undated) NOTE — MR AVS SNAPSHOT
After Visit Summary   3/1/2024    Mini Henning   MRN: [id number]           Visit Information     Date & Time  3/1/2024 11:20 AM Provider  Laila Pink APRN Department  [facility] - OB/GYN Dept. Phone  [phone]      Your Vitals Were  Most recent update: 3/1/2024 11:29 AM    BP   113/75    Pulse   55    Wt   124 lb    LMP   02/22/2024 (Approximate)    BMI   23.43 kg/m²         Allergies as of 3/1/2024  Review status set to Review Complete on 3/1/2024   No Known Allergies     Your Current Medications        Dosage    Levonorgestrel 20 MCG/24HR Intrauterine IUD 20 mcg (1 each total) by Intrauterine route once.      Diagnoses for This Visit    Well woman exam with routine gynecological exam   [900016]  -  Primary  Screening for cervical cancer   [063168]    Presence of IUD   [0919755]             We Ordered the Following     Normal Orders This Visit    Hpv Dna  High Risk , Thin Prep Collect [LSW0244 CUSTOM]     THINPREP PAP SMEAR ONLY [GOB0097 CUSTOM]     ThinPrep PAP Smear [[id number] CUSTOM]     Future Labs/Procedures Expected by Expires    Hpv Dna  High Risk , Thin Prep Collect [[id number] CUSTOM]  3/1/2024 3/1/2025    ThinPrep PAP Smear [[id number] CUSTOM]  3/1/2024 3/1/2025                Did you know that [facility] primary care physicians now offer Video Visits through [employer] for adult patients for a variety of conditions such as allergies, back pain and cold symptoms? Skip the drive and waiting room and online chat with a doctor face-to-face using your web-cam enabled computer or mobile device wherever you are. Video Visits cost $50 and can be paid hassle-free using a credit, debit, or health savings card.  Not active on [employer]? Ask us how to get signed up today!          If you receive a survey from [employer], please take a few minutes to complete it and provide feedback. We strive to deliver the best patient experience and are looking for ways to make  improvements. Your feedback will help us do so. For more information on [employer], please visit [email or web address]           No text in SmartText           No text in SmartText

## (undated) NOTE — ED AVS SNAPSHOT
Governor Deal   MRN: [id number]    Department:  [facility] Emergency Department   Date of Visit:  8/8/2018           Disclosure     Insurance plans vary and the physician(s) referred by the ER may not be covered by your plan.  Please co CARE PHYSICIAN AT ONCE OR RETURN IMMEDIATELY TO THE EMERGENCY DEPARTMENT. If you have been prescribed any medication(s), please fill your prescription right away and begin taking the medication(s) as directed.   If you believe that any of the medications

## (undated) NOTE — MR AVS SNAPSHOT
[facility]  [address]  [phone]               Thank you for choosing us for your health care visit with Ion Siddiqui DO.   We are glad to serve you and happy to provide you with this sum 5 - 6                  10,000 -   100,000    6 - 8                  15,000 -   200,000    8 - 12                 10,000 -   [phone]    Heterophilic antibodies present in the serum of some patients may cause a false-positive or false-negative result in th

## (undated) NOTE — MR AVS SNAPSHOT
[facility]  [address]  [phone]               Thank you for choosing us for your health care visit with Tameka Chowdhury DO.   We are glad to serve you and happy to provide you with this sum information, go to [email or web address]://"[employer]". Located within [facility]. org and click on the Sign Up Now link in the [employer] box. Enter your [employer] Activation Code exactly as it appears below along with your Zip Code and Date of Birth to complete the sign-up process.  If you do You don’t need to join a gym. Home exercises work great.  Put more priority on exercise in your life                    Visit [facility] online at  [email or web address].tn

## (undated) NOTE — LETTER
AUTHORIZATION FOR SURGICAL OPERATION OR OTHER PROCEDURE    1. I hereby authorize Dr. Arline Taylor, and [facility] staff assigned to my case to perform the following operation and/or procedure at the [facility] site:    _______________________________________________________________________________________________    IUD REMOVAL  _______________________________________________________________________________________________    2.  My physician has explained the nature and purpose of the operation or other procedure, possible alternative methods of treatment, the risks involved, and the possibility of complication to me.  I acknowledge that no guarantee has been made as to the result that may be obtained.  3.  I recognize that, during the course of this operation, or other procedure, unforseen conditions may necessitate additional or different procedure than those listed above.  I, therefore, further authorize and request that the above named physician, his/her physician assistants or designees perform such procedures as are, in his/her professional opinion, necessary and desirable.  4.  Any tissue or organs removed in the operation or other procedure may be disposed of by and at the discretion of the [facility] and [facility].  5.  I understand that in the event of a medical emergency, I will be transported by local paramedics to [facility] or other hospital emergency department.  6.  I certify that I have read and fully understand the above consent to operation and/or other procedure.    7.  I acknowledge that my physician has explained sedation/analgesia administration to me including the risks and benefits.  I consent to the administration of sedation/analgesia as may be necessary or desirable in the judgement of my physician.    Witness signature: ___________________________________________________ Date:  ______/______/_____                    Time:   ________ A.M.  P.M.       Patient Name:  ______________________________________________________  (please print)      Patient signature:  ___________________________________________________             Relationship to Patient:           []  Parent    Responsible person                          []  Spouse  In case of minor or                    [] Other  _____________   Incompetent name:  __________________________________________________                               (please print)      _____________      Responsible person  In case of minor or  Incompetent signature:  _______________________________________________    Statement of Physician  My signature below affirms that prior to the time of the procedure, I have explained to the patient and/or his/her guardian, the risks and benefits involved in the proposed treatment and any reasonable alternative to the proposed treatment.  I have also explained the risks and benefits involved in the refusal of the proposed treatment and have answered the patient's questions.                        Date:  ______/______/_______  Provider                      Signature:  __________________________________________________________       Time:  ___________ A.M    P.M.

## (undated) NOTE — MR AVS SNAPSHOT
After Visit Summary   5/1/2021    Ivan Chiu    MRN: [id number]           Visit Information     Date & Time  5/1/2021 10:40 AM Provider  Lucero Johnson MD [address] [place] [address], [address] [place] [address],[address], Psychiatric/[employer].  Phone ways to make improvements. Your feedback will help us do so. For more information on [employer], please visit [email or web address]                   DO YOU KNOW WHERE TO GO? Injury & Illness are never convenient.  If you are dealin locations or appointment options available at [facility],   visit [email or web address] or call [phone]. ([phone])

## (undated) NOTE — LETTER
Date & Time: 12/1/2022, 10:17 AM  Patient: Shaunna Shaver  Encounter Provider(s):    ASHWINI Prado       To Whom It May Concern:    Jacqueline Renteria was seen and treated in our department on 12/1/2022. She should not return to work until 12/05/2022. If you have any questions or concerns, please do not hesitate to call.     YAMILKA Rayo  _____________________________  [place]ST[place]/VILMA Signature

## (undated) NOTE — LETTER
17          RE:  Joselo Feldman  :  1990      To Whom It May Concern:    Joselo Feldman  is currently under our care for pregnancy. It is permissible at her gestational age for dental work to be performed.   However, if x-ray

## (undated) NOTE — LETTER
VACCINE ADMINISTRATION RECORD  PARENT / GUARDIAN APPROVAL  Date: 10/2/2018  Vaccine administered to: April Marquis     : 1990    MRN: [id number]    A copy of the appropriate Centers for Disease Control and Prevention Vaccine Information s

## (undated) NOTE — LETTER
AUTHORIZATION FOR SURGICAL OPERATION OR OTHER PROCEDURE    1.  I hereby [facility] Dr. Angeles Lo  [facility] staff assigned to my case to perform the following operation and/or procedure at the [facility]:    IUD Sal Harden      __ C.__________________________________________________  (please print)      Patient signature:  ___________________________________________________             Relationship to Patient:           []  Parent    Responsible person                          []  S

## (undated) NOTE — LETTER
17          RE:  Lucia Reinoso  :  1990      To Whom It May Concern:    Lucia Reinoso  is currently under our care for pregnancy. It is permissible at her gestational age for dental work to be performed.   However, if x-rays are needed, please

## (undated) NOTE — ED AVS SNAPSHOT
[facility] Emergency Department    [address]     [address] [place] [address]    Phone:  [phone]    Fax:  [phone]           Norma Portillo   MRN: [id number]    Department:  [facility] Emergency Department   Date of Visit:  1/28/20 aspect of your visit today. In an effort to constantly improve our service to you, we would appreciate any positive or negative feedback related to the care you received in our emergency department. Please call our [address] at [phone].   Your Elsa contact you. Please make sure we have your correct phone number on file.       I certified that I have received a copy of the aftercare instructions; that these instructions have been explained to me; all questions pertaining to these instructions have bee Enter your Server [employer] Activation Code exactly as it appears below along with your Zip Code and Date of Birth to complete the sign-up process. If you do not sign up before the expiration date, you must request a new code.     Your unique Server D[employer] Access Code: [email or web address]

## (undated) NOTE — LETTER
12/27/2018              [place] [address]        [address] [address]         To Whom It May Concern,    Solomon Going delivered vaginally on 12/21/18.  If you have any further questions you can contact me at the ph